# Patient Record
Sex: MALE | Race: WHITE | Employment: FULL TIME | URBAN - METROPOLITAN AREA
[De-identification: names, ages, dates, MRNs, and addresses within clinical notes are randomized per-mention and may not be internally consistent; named-entity substitution may affect disease eponyms.]

---
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
## 2018-12-04 ENCOUNTER — HOSPITAL ENCOUNTER (EMERGENCY)
Age: 18
Discharge: OTHER INSTITUTION | End: 2018-12-05
Attending: EMERGENCY MEDICINE
Payer: COMMERCIAL

## 2018-12-04 ENCOUNTER — APPOINTMENT (OUTPATIENT)
Dept: CT IMAGING | Age: 18
End: 2018-12-04
Payer: COMMERCIAL

## 2018-12-04 DIAGNOSIS — R41.82 ALTERED MENTAL STATUS, UNSPECIFIED ALTERED MENTAL STATUS TYPE: Primary | ICD-10-CM

## 2018-12-04 DIAGNOSIS — F32.89 OTHER DEPRESSION: ICD-10-CM

## 2018-12-04 LAB
ACETAMINOPHEN LEVEL: <15 UG/ML (ref 10–30)
ALBUMIN SERPL-MCNC: 5.7 G/DL (ref 3.9–4.9)
ALP BLD-CCNC: 45 U/L (ref 35–104)
ALT SERPL-CCNC: 14 U/L (ref 0–41)
ANION GAP SERPL CALCULATED.3IONS-SCNC: 12 MEQ/L (ref 7–13)
ANION GAP SERPL CALCULATED.3IONS-SCNC: 34 MEQ/L (ref 7–13)
AST SERPL-CCNC: 21 U/L (ref 0–40)
BASOPHILS ABSOLUTE: 0.1 K/UL (ref 0–0.2)
BASOPHILS RELATIVE PERCENT: 0.3 %
BILIRUB SERPL-MCNC: 1.1 MG/DL (ref 0–1.2)
BUN BLDV-MCNC: 6 MG/DL (ref 6–20)
BUN BLDV-MCNC: 7 MG/DL (ref 6–20)
CALCIUM SERPL-MCNC: 10.7 MG/DL (ref 8.6–10.2)
CALCIUM SERPL-MCNC: 9.3 MG/DL (ref 8.6–10.2)
CHLORIDE BLD-SCNC: 105 MEQ/L (ref 98–107)
CHLORIDE BLD-SCNC: 108 MEQ/L (ref 98–107)
CO2: 10 MEQ/L (ref 22–29)
CO2: 22 MEQ/L (ref 22–29)
CREAT SERPL-MCNC: 0.92 MG/DL (ref 0.7–1.2)
CREAT SERPL-MCNC: 1.02 MG/DL (ref 0.7–1.2)
EKG ATRIAL RATE: 97 BPM
EKG P AXIS: 73 DEGREES
EKG P-R INTERVAL: 160 MS
EKG Q-T INTERVAL: 386 MS
EKG QRS DURATION: 104 MS
EKG QTC CALCULATION (BAZETT): 490 MS
EKG R AXIS: 104 DEGREES
EKG T AXIS: 66 DEGREES
EKG VENTRICULAR RATE: 97 BPM
EOSINOPHILS ABSOLUTE: 0.1 K/UL (ref 0–0.7)
EOSINOPHILS RELATIVE PERCENT: 0.3 %
ETHANOL PERCENT: NORMAL G/DL
ETHANOL: <10 MG/DL (ref 0–0.08)
GFR AFRICAN AMERICAN: >60
GFR AFRICAN AMERICAN: >60
GFR NON-AFRICAN AMERICAN: >60
GFR NON-AFRICAN AMERICAN: >60
GLOBULIN: 3.3 G/DL (ref 2.3–3.5)
GLUCOSE BLD-MCNC: 140 MG/DL (ref 74–109)
GLUCOSE BLD-MCNC: 179 MG/DL (ref 74–109)
HCT VFR BLD CALC: 49.3 % (ref 42–52)
HEMOGLOBIN: 16.3 G/DL (ref 14–18)
LYMPHOCYTES ABSOLUTE: 3.6 K/UL (ref 1–4.8)
LYMPHOCYTES RELATIVE PERCENT: 16.8 %
MCH RBC QN AUTO: 29.3 PG (ref 27–31.3)
MCHC RBC AUTO-ENTMCNC: 33 % (ref 33–37)
MCV RBC AUTO: 88.8 FL (ref 80–100)
MONOCYTES ABSOLUTE: 1.1 K/UL (ref 0.2–0.8)
MONOCYTES RELATIVE PERCENT: 5.2 %
NEUTROPHILS ABSOLUTE: 16.5 K/UL (ref 1.4–6.5)
NEUTROPHILS RELATIVE PERCENT: 77.4 %
PDW BLD-RTO: 13.2 % (ref 11.5–14.5)
PLATELET # BLD: 269 K/UL (ref 130–400)
POTASSIUM SERPL-SCNC: 3.6 MEQ/L (ref 3.5–5.1)
POTASSIUM SERPL-SCNC: 4 MEQ/L (ref 3.5–5.1)
RBC # BLD: 5.56 M/UL (ref 4.7–6.1)
SALICYLATE, SERUM: <0.3 MG/DL (ref 15–30)
SODIUM BLD-SCNC: 142 MEQ/L (ref 132–144)
SODIUM BLD-SCNC: 149 MEQ/L (ref 132–144)
TOTAL CK: 139 U/L (ref 0–190)
TOTAL PROTEIN: 9 G/DL (ref 6.4–8.1)
WBC # BLD: 21.4 K/UL (ref 4.5–11)

## 2018-12-04 PROCEDURE — G0480 DRUG TEST DEF 1-7 CLASSES: HCPCS

## 2018-12-04 PROCEDURE — 2580000003 HC RX 258: Performed by: EMERGENCY MEDICINE

## 2018-12-04 PROCEDURE — 84443 ASSAY THYROID STIM HORMONE: CPT

## 2018-12-04 PROCEDURE — 70450 CT HEAD/BRAIN W/O DYE: CPT

## 2018-12-04 PROCEDURE — 93005 ELECTROCARDIOGRAM TRACING: CPT

## 2018-12-04 PROCEDURE — 99285 EMERGENCY DEPT VISIT HI MDM: CPT

## 2018-12-04 PROCEDURE — 80053 COMPREHEN METABOLIC PANEL: CPT

## 2018-12-04 PROCEDURE — 96374 THER/PROPH/DIAG INJ IV PUSH: CPT

## 2018-12-04 PROCEDURE — 6360000002 HC RX W HCPCS: Performed by: EMERGENCY MEDICINE

## 2018-12-04 PROCEDURE — 82550 ASSAY OF CK (CPK): CPT

## 2018-12-04 PROCEDURE — 36415 COLL VENOUS BLD VENIPUNCTURE: CPT

## 2018-12-04 PROCEDURE — 85025 COMPLETE CBC W/AUTO DIFF WBC: CPT

## 2018-12-04 RX ORDER — 0.9 % SODIUM CHLORIDE 0.9 %
1000 INTRAVENOUS SOLUTION INTRAVENOUS ONCE
Status: COMPLETED | OUTPATIENT
Start: 2018-12-04 | End: 2018-12-04

## 2018-12-04 RX ORDER — PAROXETINE HYDROCHLORIDE 20 MG/1
20 TABLET, FILM COATED ORAL
Status: ON HOLD | COMMUNITY
Start: 2018-12-05 | End: 2018-12-07 | Stop reason: HOSPADM

## 2018-12-04 RX ORDER — NALOXONE HYDROCHLORIDE 1 MG/ML
1 INJECTION INTRAMUSCULAR; INTRAVENOUS; SUBCUTANEOUS ONCE
Status: COMPLETED | OUTPATIENT
Start: 2018-12-04 | End: 2018-12-04

## 2018-12-04 RX ORDER — HYDROXYZINE HYDROCHLORIDE 25 MG/1
25 TABLET, FILM COATED ORAL EVERY 4 HOURS PRN
Status: ON HOLD | COMMUNITY
End: 2018-12-07 | Stop reason: HOSPADM

## 2018-12-04 RX ADMIN — SODIUM CHLORIDE 1000 ML: 9 INJECTION, SOLUTION INTRAVENOUS at 20:38

## 2018-12-04 RX ADMIN — NALOXONE HYDROCHLORIDE 1 MG: 1 INJECTION PARENTERAL at 20:36

## 2018-12-05 ENCOUNTER — APPOINTMENT (OUTPATIENT)
Dept: MRI IMAGING | Age: 18
DRG: 065 | End: 2018-12-05
Attending: INTERNAL MEDICINE
Payer: COMMERCIAL

## 2018-12-05 ENCOUNTER — HOSPITAL ENCOUNTER (INPATIENT)
Age: 18
LOS: 2 days | Discharge: HOME | DRG: 065 | End: 2018-12-07
Attending: INTERNAL MEDICINE | Admitting: INTERNAL MEDICINE
Payer: COMMERCIAL

## 2018-12-05 ENCOUNTER — APPOINTMENT (OUTPATIENT)
Dept: CT IMAGING | Age: 18
DRG: 065 | End: 2018-12-05
Attending: INTERNAL MEDICINE
Payer: COMMERCIAL

## 2018-12-05 VITALS
SYSTOLIC BLOOD PRESSURE: 115 MMHG | DIASTOLIC BLOOD PRESSURE: 66 MMHG | TEMPERATURE: 98.9 F | OXYGEN SATURATION: 97 % | RESPIRATION RATE: 16 BRPM | WEIGHT: 165 LBS | BODY MASS INDEX: 22.35 KG/M2 | HEART RATE: 91 BPM | HEIGHT: 72 IN

## 2018-12-05 PROBLEM — D72.829 LEUKOCYTOSIS: Status: ACTIVE | Noted: 2018-12-05

## 2018-12-05 LAB
AMPHETAMINE SCREEN, URINE: ABNORMAL
ANION GAP SERPL CALCULATED.3IONS-SCNC: 14 MEQ/L (ref 7–13)
BARBITURATE SCREEN URINE: ABNORMAL
BASOPHILS ABSOLUTE: 0 K/UL (ref 0–0.2)
BASOPHILS RELATIVE PERCENT: 0.3 %
BENZODIAZEPINE SCREEN, URINE: ABNORMAL
BILIRUBIN URINE: NEGATIVE
BLOOD, URINE: NEGATIVE
BUN BLDV-MCNC: 7 MG/DL (ref 6–20)
C-REACTIVE PROTEIN, HIGH SENSITIVITY: 2.2 MG/L (ref 0–5)
CALCIUM SERPL-MCNC: 8.8 MG/DL (ref 8.6–10.2)
CANNABINOID SCREEN URINE: POSITIVE
CHLORIDE BLD-SCNC: 106 MEQ/L (ref 98–107)
CLARITY: ABNORMAL
CO2: 22 MEQ/L (ref 22–29)
COCAINE METABOLITE SCREEN URINE: ABNORMAL
COLOR: YELLOW
CREAT SERPL-MCNC: 0.78 MG/DL (ref 0.7–1.2)
EOSINOPHILS ABSOLUTE: 0 K/UL (ref 0–0.7)
EOSINOPHILS RELATIVE PERCENT: 0.1 %
GFR AFRICAN AMERICAN: >60
GFR NON-AFRICAN AMERICAN: >60
GLUCOSE BLD-MCNC: 104 MG/DL (ref 74–109)
GLUCOSE URINE: NEGATIVE MG/DL
HCT VFR BLD CALC: 39.7 % (ref 42–52)
HEMOGLOBIN: 13.5 G/DL (ref 14–18)
INR BLD: 1.2
KETONES, URINE: ABNORMAL MG/DL
LACTIC ACID: 0.8 MMOL/L (ref 0.5–2.2)
LEUKOCYTE ESTERASE, URINE: NEGATIVE
LYMPHOCYTES ABSOLUTE: 1.4 K/UL (ref 1–4.8)
LYMPHOCYTES RELATIVE PERCENT: 13.5 %
Lab: ABNORMAL
MAGNESIUM: 2.3 MG/DL (ref 1.7–2.3)
MCH RBC QN AUTO: 30 PG (ref 27–31.3)
MCHC RBC AUTO-ENTMCNC: 34 % (ref 33–37)
MCV RBC AUTO: 88.4 FL (ref 80–100)
MONOCYTES ABSOLUTE: 0.6 K/UL (ref 0.2–0.8)
MONOCYTES RELATIVE PERCENT: 5.4 %
NEUTROPHILS ABSOLUTE: 8.5 K/UL (ref 1.4–6.5)
NEUTROPHILS RELATIVE PERCENT: 80.7 %
NITRITE, URINE: NEGATIVE
OPIATE SCREEN URINE: ABNORMAL
PDW BLD-RTO: 13.1 % (ref 11.5–14.5)
PH UA: 5 (ref 5–9)
PHENCYCLIDINE SCREEN URINE: ABNORMAL
PLATELET # BLD: 162 K/UL (ref 130–400)
POTASSIUM REFLEX MAGNESIUM: 3.3 MEQ/L (ref 3.5–5.1)
PROTEIN UA: NEGATIVE MG/DL
PROTHROMBIN TIME: 12 SEC (ref 9–11.5)
RBC # BLD: 4.49 M/UL (ref 4.7–6.1)
SEDIMENTATION RATE, ERYTHROCYTE: 2 MM (ref 0–10)
SODIUM BLD-SCNC: 142 MEQ/L (ref 132–144)
SPECIFIC GRAVITY UA: 1.02 (ref 1–1.03)
TSH SERPL DL<=0.05 MIU/L-ACNC: 1.94 UIU/ML (ref 0.27–4.2)
URINE REFLEX TO CULTURE: ABNORMAL
UROBILINOGEN, URINE: 0.2 E.U./DL
WBC # BLD: 10.6 K/UL (ref 4.5–11)

## 2018-12-05 PROCEDURE — 74160 CT ABDOMEN W/CONTRAST: CPT

## 2018-12-05 PROCEDURE — 2580000003 HC RX 258: Performed by: HOSPITALIST

## 2018-12-05 PROCEDURE — 80307 DRUG TEST PRSMV CHEM ANLYZR: CPT

## 2018-12-05 PROCEDURE — 70551 MRI BRAIN STEM W/O DYE: CPT

## 2018-12-05 PROCEDURE — 83605 ASSAY OF LACTIC ACID: CPT

## 2018-12-05 PROCEDURE — 6360000004 HC RX CONTRAST MEDICATION: Performed by: PSYCHIATRY & NEUROLOGY

## 2018-12-05 PROCEDURE — 86255 FLUORESCENT ANTIBODY SCREEN: CPT

## 2018-12-05 PROCEDURE — 86141 C-REACTIVE PROTEIN HS: CPT

## 2018-12-05 PROCEDURE — 6370000000 HC RX 637 (ALT 250 FOR IP): Performed by: PSYCHIATRY & NEUROLOGY

## 2018-12-05 PROCEDURE — 81003 URINALYSIS AUTO W/O SCOPE: CPT

## 2018-12-05 PROCEDURE — 83735 ASSAY OF MAGNESIUM: CPT

## 2018-12-05 PROCEDURE — 95816 EEG AWAKE AND DROWSY: CPT

## 2018-12-05 PROCEDURE — 70549 MR ANGIOGRAPH NECK W/O&W/DYE: CPT

## 2018-12-05 PROCEDURE — 85613 RUSSELL VIPER VENOM DILUTED: CPT

## 2018-12-05 PROCEDURE — 71270 CT THORAX DX C-/C+: CPT

## 2018-12-05 PROCEDURE — 85302 CLOT INHIBIT PROT C ANTIGEN: CPT

## 2018-12-05 PROCEDURE — 36415 COLL VENOUS BLD VENIPUNCTURE: CPT

## 2018-12-05 PROCEDURE — 85730 THROMBOPLASTIN TIME PARTIAL: CPT

## 2018-12-05 PROCEDURE — 85610 PROTHROMBIN TIME: CPT

## 2018-12-05 PROCEDURE — 80048 BASIC METABOLIC PNL TOTAL CA: CPT

## 2018-12-05 PROCEDURE — 99253 IP/OBS CNSLTJ NEW/EST LOW 45: CPT | Performed by: INTERNAL MEDICINE

## 2018-12-05 PROCEDURE — 2580000003 HC RX 258: Performed by: PSYCHIATRY & NEUROLOGY

## 2018-12-05 PROCEDURE — 85300 ANTITHROMBIN III ACTIVITY: CPT

## 2018-12-05 PROCEDURE — A9577 INJ MULTIHANCE: HCPCS | Performed by: PSYCHIATRY & NEUROLOGY

## 2018-12-05 PROCEDURE — 83516 IMMUNOASSAY NONANTIBODY: CPT

## 2018-12-05 PROCEDURE — 6360000002 HC RX W HCPCS: Performed by: HOSPITALIST

## 2018-12-05 PROCEDURE — 85652 RBC SED RATE AUTOMATED: CPT

## 2018-12-05 PROCEDURE — 1210000000 HC MED SURG R&B

## 2018-12-05 PROCEDURE — 85025 COMPLETE CBC W/AUTO DIFF WBC: CPT

## 2018-12-05 PROCEDURE — 85305 CLOT INHIBIT PROT S TOTAL: CPT

## 2018-12-05 PROCEDURE — 70546 MR ANGIOGRAPH HEAD W/O&W/DYE: CPT

## 2018-12-05 RX ORDER — ONDANSETRON 2 MG/ML
4 INJECTION INTRAMUSCULAR; INTRAVENOUS EVERY 6 HOURS PRN
Status: DISCONTINUED | OUTPATIENT
Start: 2018-12-05 | End: 2018-12-07 | Stop reason: HOSPADM

## 2018-12-05 RX ORDER — LORAZEPAM 2 MG/ML
1 INJECTION INTRAMUSCULAR
Status: ACTIVE | OUTPATIENT
Start: 2018-12-05 | End: 2018-12-05

## 2018-12-05 RX ORDER — SODIUM CHLORIDE 0.9 % (FLUSH) 0.9 %
10 SYRINGE (ML) INJECTION EVERY 12 HOURS SCHEDULED
Status: CANCELLED | OUTPATIENT
Start: 2018-12-05

## 2018-12-05 RX ORDER — SODIUM CHLORIDE 0.9 % (FLUSH) 0.9 %
10 SYRINGE (ML) INJECTION EVERY 12 HOURS SCHEDULED
Status: DISCONTINUED | OUTPATIENT
Start: 2018-12-05 | End: 2018-12-06

## 2018-12-05 RX ORDER — 0.9 % SODIUM CHLORIDE 0.9 %
10 VIAL (ML) INJECTION ONCE
Status: COMPLETED | OUTPATIENT
Start: 2018-12-05 | End: 2018-12-05

## 2018-12-05 RX ORDER — 0.9 % SODIUM CHLORIDE 0.9 %
1000 INTRAVENOUS SOLUTION INTRAVENOUS ONCE
Status: COMPLETED | OUTPATIENT
Start: 2018-12-05 | End: 2018-12-05

## 2018-12-05 RX ORDER — SODIUM CHLORIDE 9 MG/ML
INJECTION, SOLUTION INTRAVENOUS CONTINUOUS
Status: CANCELLED | OUTPATIENT
Start: 2018-12-06

## 2018-12-05 RX ORDER — ACETAMINOPHEN 325 MG/1
650 TABLET ORAL EVERY 4 HOURS PRN
Status: DISCONTINUED | OUTPATIENT
Start: 2018-12-05 | End: 2018-12-07 | Stop reason: HOSPADM

## 2018-12-05 RX ORDER — SODIUM CHLORIDE 0.9 % (FLUSH) 0.9 %
10 SYRINGE (ML) INJECTION PRN
Status: CANCELLED | OUTPATIENT
Start: 2018-12-05

## 2018-12-05 RX ORDER — SODIUM CHLORIDE 0.9 % (FLUSH) 0.9 %
10 SYRINGE (ML) INJECTION PRN
Status: DISCONTINUED | OUTPATIENT
Start: 2018-12-05 | End: 2018-12-06

## 2018-12-05 RX ORDER — LEVETIRACETAM 500 MG/1
500 TABLET ORAL 2 TIMES DAILY
Status: DISCONTINUED | OUTPATIENT
Start: 2018-12-05 | End: 2018-12-07 | Stop reason: HOSPADM

## 2018-12-05 RX ADMIN — ENOXAPARIN SODIUM 40 MG: 40 INJECTION SUBCUTANEOUS at 10:04

## 2018-12-05 RX ADMIN — Medication 10 ML: at 10:04

## 2018-12-05 RX ADMIN — LEVETIRACETAM 500 MG: 500 TABLET ORAL at 11:09

## 2018-12-05 RX ADMIN — IOPAMIDOL 100 ML: 612 INJECTION, SOLUTION INTRAVENOUS at 14:16

## 2018-12-05 RX ADMIN — SODIUM CHLORIDE 10 ML: 9 INJECTION INTRAMUSCULAR; INTRAVENOUS; SUBCUTANEOUS at 20:40

## 2018-12-05 RX ADMIN — GADOBENATE DIMEGLUMINE 20 ML: 529 INJECTION, SOLUTION INTRAVENOUS at 13:47

## 2018-12-05 RX ADMIN — ASPIRIN 325 MG: 325 TABLET, DELAYED RELEASE ORAL at 11:09

## 2018-12-05 RX ADMIN — LEVETIRACETAM 500 MG: 500 TABLET ORAL at 20:33

## 2018-12-05 RX ADMIN — SODIUM CHLORIDE 1000 ML: 9 INJECTION, SOLUTION INTRAVENOUS at 03:59

## 2018-12-05 ASSESSMENT — PAIN SCALES - GENERAL
PAINLEVEL_OUTOF10: 0
PAINLEVEL_OUTOF10: 0

## 2018-12-05 ASSESSMENT — ENCOUNTER SYMPTOMS
EYES NEGATIVE: 1
VOMITING: 0
NAUSEA: 0
ALLERGIC/IMMUNOLOGIC NEGATIVE: 1
SHORTNESS OF BREATH: 0
GASTROINTESTINAL NEGATIVE: 1
ABDOMINAL PAIN: 0
WHEEZING: 0

## 2018-12-05 NOTE — ED TRIAGE NOTES
Pt comes to the ED with mother. He was unresponsive upon arrival, was breathing and pulse  Ox at 94% on RA.  Pt was given narcan 1mg IV

## 2018-12-05 NOTE — CONSULTS
Fito Eid, APRN - CNP        LORazepam (ATIVAN) injection 1 mg  1 mg Intravenous Once PRN Nikki Phillips MD        aspirin EC tablet 325 mg  325 mg Oral Daily Nikki Phillips MD   325 mg at 12/05/18 1109    levETIRAcetam (KEPPRA) tablet 500 mg  500 mg Oral BID Nikki Phillips MD   500 mg at 12/05/18 1109    sodium chloride (PF) 0.9 % injection 10 mL  10 mL Intravenous Once Nikki Phillips MD           ALLERGIES: Patient has no known allergies. Review of Systems   Constitutional: Negative. Negative for chills and fever. HENT: Negative. Eyes: Negative. Respiratory: Negative for shortness of breath and wheezing. Cardiovascular: Negative for chest pain, palpitations and leg swelling. Gastrointestinal: Negative. Negative for abdominal pain, nausea and vomiting. Endocrine: Negative. Genitourinary: Negative. Musculoskeletal: Negative. Skin: Negative. Negative for rash. Allergic/Immunologic: Negative. Neurological: Negative for dizziness, weakness and headaches. Psychiatric/Behavioral: Negative. VITALS:  Blood pressure 124/81, pulse 55, temperature 97.2 °F (36.2 °C), temperature source Oral, resp. rate 16, height 6' 1\" (1.854 m), weight 141 lb 12.1 oz (64.3 kg), SpO2 100 %. Body mass index is 18.7 kg/m². Physical Exam   Constitutional: He is oriented to person, place, and time. He appears well-developed and well-nourished. HENT:   Head: Normocephalic and atraumatic. Eyes: Pupils are equal, round, and reactive to light. Neck: Normal range of motion. Neck supple. No JVD present. No tracheal deviation present. No thyromegaly present. Cardiovascular: Normal rate, regular rhythm, normal heart sounds and intact distal pulses. PMI is not displaced. Exam reveals no gallop, no S3, no distant heart sounds and no friction rub. No murmur heard. Pulmonary/Chest: No respiratory distress. He has no wheezes. He has no rales. He exhibits no tenderness. Abdominal: Soft.

## 2018-12-05 NOTE — PLAN OF CARE
Problem: Falls - Risk of:  Goal: Will remain free from falls  Will remain free from falls   Outcome: Ongoing    Goal: Absence of physical injury  Absence of physical injury   Outcome: Ongoing      Problem: Coping:  Goal: Ability to cope will improve  Ability to cope will improve  Outcome: Ongoing    Goal: Ability to identify appropriate support needs will improve  Ability to identify appropriate support needs will improve  Outcome: Ongoing      Problem: Health Behavior:  Goal: Ability to manage health-related needs will improve  Ability to manage health-related needs will improve  Outcome: Ongoing      Problem: Physical Regulation:  Goal: Signs of adequate cerebral perfusion will increase  Signs of adequate cerebral perfusion will increase  Outcome: Ongoing    Goal: Ability to maintain a stable neurologic state will improve  Ability to maintain a stable neurologic state will improve  Outcome: Ongoing      Problem: Safety:  Goal: Ability to remain free from injury will improve  Ability to remain free from injury will improve  Outcome: Ongoing      Problem: Self-Concept:  Goal: Level of anxiety will decrease  Level of anxiety will decrease  Outcome: Ongoing    Goal: Ability to verbalize feelings about condition will improve  Ability to verbalize feelings about condition will improve  Outcome: Ongoing

## 2018-12-05 NOTE — ED NOTES
23:07 Dr. Sukhi Lofton returned call to Dr. Sophia Martinez and accepted patient     Abby Chen  12/04/18 [place]  12/04/18 [id number]

## 2018-12-05 NOTE — ED PROVIDER NOTES
98.9 °F (37.2 °C)   TempSrc:  Oral   SpO2: 94% 97%   Weight: 165 lb (74.8 kg)    Height: 6' (1.829 m)        Patient presents to the emergency department with the complaint described above. Vital signs show tachycardia and tachypnea, he is a little hypertensive. Patient is awake but he is not cooperating with the exam.  At this time, seems like this could be some sort of sympathomimetic or even antihistamine presentation. Certainly it's possible that this is an acute psychotic breakdown as well. Possible severe depression. At this time, we did order 12-lead EKG, full metabolic workup that includes aspirin, salicylate and ethanol levels, TSH, CK, urinalysis and drug screen. IV fluids will be initiated and we will closely monitor the patient on monitoring. DIAGNOSTIC RESULTS     Twelve lead EKG interpreted by emergency department physician who either signs or cosigns this chart in the absence of a cardiologist:  A 12 lead EKG done at 2034, interpreted by myself, showed a regular rhythm at a rate of 97bpm.  The MD interval was normal.  The QRS complex was abnormal consistent with a right bundle branch block. There was no ST segment elevation or depression, T wave inversion present in right bundle-branch block pattern. QRS progression through precordial leads was abnormal.  Interpretation: In this rhythm, no ST segment elevation or depression and no pattern consistent with acute ischemia or infarct.  RBBB noted    LABS:  Labs Reviewed   CBC WITH AUTO DIFFERENTIAL - Abnormal; Notable for the following:        Result Value    WBC 21.4 (*)     Neutrophils # 16.5 (*)     Monocytes # 1.1 (*)     All other components within normal limits   COMPREHENSIVE METABOLIC PANEL - Abnormal; Notable for the following:     Sodium 149 (*)     CO2 10 (*)     Anion Gap 34 (*)     Glucose 179 (*)     Calcium 10.7 (*)     Total Protein 9.0 (*)     Alb 5.7 (*)     All other components within normal limits    Narrative:     CALL words are mis-transcribed.)    Nigel Kerr DO (electronically signed)  Attending Emergency Physician          Nigel Kerr DO  12/04/18 [id number]

## 2018-12-06 ENCOUNTER — APPOINTMENT (OUTPATIENT)
Dept: CARDIAC CATH/INVASIVE PROCEDURES | Age: 18
DRG: 065 | End: 2018-12-06
Attending: INTERNAL MEDICINE
Payer: COMMERCIAL

## 2018-12-06 LAB
ANION GAP SERPL CALCULATED.3IONS-SCNC: 13 MEQ/L (ref 7–13)
BASOPHILS ABSOLUTE: 0 K/UL (ref 0–0.2)
BASOPHILS RELATIVE PERCENT: 0.7 %
BUN BLDV-MCNC: 7 MG/DL (ref 6–20)
CALCIUM SERPL-MCNC: 9.2 MG/DL (ref 8.6–10.2)
CHLORIDE BLD-SCNC: 105 MEQ/L (ref 98–107)
CO2: 25 MEQ/L (ref 22–29)
CREAT SERPL-MCNC: 0.88 MG/DL (ref 0.7–1.2)
EOSINOPHILS ABSOLUTE: 0 K/UL (ref 0–0.7)
EOSINOPHILS RELATIVE PERCENT: 0.7 %
GFR AFRICAN AMERICAN: >60
GFR NON-AFRICAN AMERICAN: >60
GLUCOSE BLD-MCNC: 82 MG/DL (ref 74–109)
HCT VFR BLD CALC: 39.4 % (ref 42–52)
HEMOGLOBIN: 13.6 G/DL (ref 14–18)
LV EF: 60 %
LVEF MODALITY: NORMAL
LYMPHOCYTES ABSOLUTE: 2.4 K/UL (ref 1–4.8)
LYMPHOCYTES RELATIVE PERCENT: 34.4 %
MAGNESIUM: 2 MG/DL (ref 1.7–2.3)
MCH RBC QN AUTO: 30.5 PG (ref 27–31.3)
MCHC RBC AUTO-ENTMCNC: 34.4 % (ref 33–37)
MCV RBC AUTO: 88.6 FL (ref 80–100)
MONOCYTES ABSOLUTE: 0.5 K/UL (ref 0.2–0.8)
MONOCYTES RELATIVE PERCENT: 6.6 %
NEUTROPHILS ABSOLUTE: 4.1 K/UL (ref 1.4–6.5)
NEUTROPHILS RELATIVE PERCENT: 57.6 %
PDW BLD-RTO: 13.5 % (ref 11.5–14.5)
PLATELET # BLD: 164 K/UL (ref 130–400)
POTASSIUM REFLEX MAGNESIUM: 3.2 MEQ/L (ref 3.5–5.1)
RBC # BLD: 4.45 M/UL (ref 4.7–6.1)
SODIUM BLD-SCNC: 143 MEQ/L (ref 132–144)
WBC # BLD: 7 K/UL (ref 4.5–11)

## 2018-12-06 PROCEDURE — 80048 BASIC METABOLIC PNL TOTAL CA: CPT

## 2018-12-06 PROCEDURE — 6370000000 HC RX 637 (ALT 250 FOR IP): Performed by: PSYCHIATRY & NEUROLOGY

## 2018-12-06 PROCEDURE — 36415 COLL VENOUS BLD VENIPUNCTURE: CPT

## 2018-12-06 PROCEDURE — 2580000003 HC RX 258: Performed by: INTERNAL MEDICINE

## 2018-12-06 PROCEDURE — 93312 ECHO TRANSESOPHAGEAL: CPT | Performed by: INTERNAL MEDICINE

## 2018-12-06 PROCEDURE — 1210000000 HC MED SURG R&B

## 2018-12-06 PROCEDURE — 6370000000 HC RX 637 (ALT 250 FOR IP): Performed by: CLINICAL NURSE SPECIALIST

## 2018-12-06 PROCEDURE — 99253 IP/OBS CNSLTJ NEW/EST LOW 45: CPT | Performed by: CLINICAL NURSE SPECIALIST

## 2018-12-06 PROCEDURE — 6360000002 HC RX W HCPCS

## 2018-12-06 PROCEDURE — 93321 DOPPLER ECHO F-UP/LMTD STD: CPT

## 2018-12-06 PROCEDURE — 85025 COMPLETE CBC W/AUTO DIFF WBC: CPT

## 2018-12-06 PROCEDURE — 83735 ASSAY OF MAGNESIUM: CPT

## 2018-12-06 PROCEDURE — 93325 DOPPLER ECHO COLOR FLOW MAPG: CPT

## 2018-12-06 PROCEDURE — 93312 ECHO TRANSESOPHAGEAL: CPT

## 2018-12-06 PROCEDURE — 6360000002 HC RX W HCPCS: Performed by: HOSPITALIST

## 2018-12-06 PROCEDURE — 6370000000 HC RX 637 (ALT 250 FOR IP): Performed by: INTERNAL MEDICINE

## 2018-12-06 RX ORDER — SODIUM CHLORIDE 9 MG/ML
INJECTION, SOLUTION INTRAVENOUS CONTINUOUS
Status: DISCONTINUED | OUTPATIENT
Start: 2018-12-06 | End: 2018-12-07 | Stop reason: HOSPADM

## 2018-12-06 RX ORDER — FAMOTIDINE 20 MG/1
20 TABLET, FILM COATED ORAL 2 TIMES DAILY
Status: DISCONTINUED | OUTPATIENT
Start: 2018-12-06 | End: 2018-12-07 | Stop reason: HOSPADM

## 2018-12-06 RX ORDER — VENLAFAXINE 37.5 MG/1
37.5 TABLET ORAL
Status: DISCONTINUED | OUTPATIENT
Start: 2018-12-06 | End: 2018-12-07

## 2018-12-06 RX ORDER — FENTANYL CITRATE 50 UG/ML
100 INJECTION, SOLUTION INTRAMUSCULAR; INTRAVENOUS ONCE
Status: DISCONTINUED | OUTPATIENT
Start: 2018-12-06 | End: 2018-12-07 | Stop reason: HOSPADM

## 2018-12-06 RX ORDER — SODIUM CHLORIDE 450 MG/100ML
INJECTION, SOLUTION INTRAVENOUS
Status: DISCONTINUED
Start: 2018-12-06 | End: 2018-12-06

## 2018-12-06 RX ORDER — MIDAZOLAM HYDROCHLORIDE 1 MG/ML
5 INJECTION INTRAMUSCULAR; INTRAVENOUS ONCE
Status: DISCONTINUED | OUTPATIENT
Start: 2018-12-06 | End: 2018-12-07 | Stop reason: HOSPADM

## 2018-12-06 RX ADMIN — LEVETIRACETAM 500 MG: 500 TABLET ORAL at 21:53

## 2018-12-06 RX ADMIN — SODIUM CHLORIDE: 9 INJECTION, SOLUTION INTRAVENOUS at 17:55

## 2018-12-06 RX ADMIN — ONDANSETRON 4 MG: 2 INJECTION INTRAMUSCULAR; INTRAVENOUS at 17:57

## 2018-12-06 RX ADMIN — FAMOTIDINE 20 MG: 20 TABLET ORAL at 21:53

## 2018-12-06 RX ADMIN — VENLAFAXINE 37.5 MG: 37.5 TABLET ORAL at 18:37

## 2018-12-06 RX ADMIN — LEVETIRACETAM 500 MG: 500 TABLET ORAL at 09:15

## 2018-12-06 ASSESSMENT — PAIN SCALES - GENERAL: PAINLEVEL_OUTOF10: 0

## 2018-12-07 VITALS
RESPIRATION RATE: 14 BRPM | BODY MASS INDEX: 18.79 KG/M2 | TEMPERATURE: 97.5 F | SYSTOLIC BLOOD PRESSURE: 102 MMHG | DIASTOLIC BLOOD PRESSURE: 53 MMHG | HEIGHT: 73 IN | WEIGHT: 141.76 LBS | HEART RATE: 47 BPM | OXYGEN SATURATION: 99 %

## 2018-12-07 LAB
ANION GAP SERPL CALCULATED.3IONS-SCNC: 13 MEQ/L (ref 7–13)
BASOPHILS ABSOLUTE: 0 K/UL (ref 0–0.2)
BASOPHILS RELATIVE PERCENT: 0.7 %
BUN BLDV-MCNC: 8 MG/DL (ref 6–20)
CALCIUM SERPL-MCNC: 8.3 MG/DL (ref 8.6–10.2)
CHLORIDE BLD-SCNC: 107 MEQ/L (ref 98–107)
CHOLESTEROL, TOTAL: 112 MG/DL (ref 0–199)
CO2: 23 MEQ/L (ref 22–29)
CREAT SERPL-MCNC: 0.78 MG/DL (ref 0.7–1.2)
EOSINOPHILS ABSOLUTE: 0.1 K/UL (ref 0–0.7)
EOSINOPHILS RELATIVE PERCENT: 1 %
GFR AFRICAN AMERICAN: >60
GFR NON-AFRICAN AMERICAN: >60
GLUCOSE BLD-MCNC: 86 MG/DL (ref 74–109)
HCT VFR BLD CALC: 37.5 % (ref 42–52)
HDLC SERPL-MCNC: 28 MG/DL (ref 40–59)
HEMOGLOBIN: 13 G/DL (ref 14–18)
LDL CHOLESTEROL CALCULATED: 66 MG/DL (ref 0–129)
LYMPHOCYTES ABSOLUTE: 2.3 K/UL (ref 1–4.8)
LYMPHOCYTES RELATIVE PERCENT: 34.7 %
MCH RBC QN AUTO: 30.4 PG (ref 27–31.3)
MCHC RBC AUTO-ENTMCNC: 34.8 % (ref 33–37)
MCV RBC AUTO: 87.6 FL (ref 80–100)
MONOCYTES ABSOLUTE: 0.5 K/UL (ref 0.2–0.8)
MONOCYTES RELATIVE PERCENT: 7.4 %
NEUTROPHILS ABSOLUTE: 3.7 K/UL (ref 1.4–6.5)
NEUTROPHILS RELATIVE PERCENT: 56.2 %
PDW BLD-RTO: 13.1 % (ref 11.5–14.5)
PLATELET # BLD: 142 K/UL (ref 130–400)
POTASSIUM REFLEX MAGNESIUM: 3.8 MEQ/L (ref 3.5–5.1)
RBC # BLD: 4.28 M/UL (ref 4.7–6.1)
SODIUM BLD-SCNC: 143 MEQ/L (ref 132–144)
TRIGL SERPL-MCNC: 89 MG/DL (ref 0–200)
WBC # BLD: 6.5 K/UL (ref 4.5–11)

## 2018-12-07 PROCEDURE — 85025 COMPLETE CBC W/AUTO DIFF WBC: CPT

## 2018-12-07 PROCEDURE — 80048 BASIC METABOLIC PNL TOTAL CA: CPT

## 2018-12-07 PROCEDURE — 6370000000 HC RX 637 (ALT 250 FOR IP): Performed by: CLINICAL NURSE SPECIALIST

## 2018-12-07 PROCEDURE — 6370000000 HC RX 637 (ALT 250 FOR IP): Performed by: INTERNAL MEDICINE

## 2018-12-07 PROCEDURE — 6370000000 HC RX 637 (ALT 250 FOR IP): Performed by: PSYCHIATRY & NEUROLOGY

## 2018-12-07 PROCEDURE — 36415 COLL VENOUS BLD VENIPUNCTURE: CPT

## 2018-12-07 PROCEDURE — 80061 LIPID PANEL: CPT

## 2018-12-07 RX ORDER — ATORVASTATIN CALCIUM 10 MG/1
10 TABLET, FILM COATED ORAL NIGHTLY
Status: DISCONTINUED | OUTPATIENT
Start: 2018-12-07 | End: 2018-12-07 | Stop reason: HOSPADM

## 2018-12-07 RX ORDER — VENLAFAXINE HYDROCHLORIDE 37.5 MG/1
37.5 CAPSULE, EXTENDED RELEASE ORAL
Qty: 30 CAPSULE | Refills: 1 | Status: SHIPPED | OUTPATIENT
Start: 2018-12-08 | End: 2020-04-02 | Stop reason: DRUGHIGH

## 2018-12-07 RX ORDER — VENLAFAXINE HYDROCHLORIDE 37.5 MG/1
37.5 CAPSULE, EXTENDED RELEASE ORAL
Status: DISCONTINUED | OUTPATIENT
Start: 2018-12-08 | End: 2018-12-07 | Stop reason: HOSPADM

## 2018-12-07 RX ORDER — LEVETIRACETAM 500 MG/1
500 TABLET ORAL 2 TIMES DAILY
Qty: 60 TABLET | Refills: 1 | Status: SHIPPED | OUTPATIENT
Start: 2018-12-07 | End: 2020-02-11

## 2018-12-07 RX ORDER — ATORVASTATIN CALCIUM 10 MG/1
10 TABLET, FILM COATED ORAL NIGHTLY
Qty: 30 TABLET | Refills: 1 | Status: SHIPPED | OUTPATIENT
Start: 2018-12-07 | End: 2020-02-11

## 2018-12-07 RX ADMIN — ASPIRIN 325 MG: 325 TABLET, DELAYED RELEASE ORAL at 08:27

## 2018-12-07 RX ADMIN — VENLAFAXINE 37.5 MG: 37.5 TABLET ORAL at 08:27

## 2018-12-07 RX ADMIN — LEVETIRACETAM 500 MG: 500 TABLET ORAL at 08:27

## 2018-12-07 RX ADMIN — FAMOTIDINE 20 MG: 20 TABLET ORAL at 08:27

## 2018-12-07 ASSESSMENT — PAIN SCALES - GENERAL: PAINLEVEL_OUTOF10: 0

## 2018-12-07 NOTE — DISCHARGE SUMMARY
Conjunctivae/corneas clear. Neck: Supple, with full range of motion. No jugular venous distention. Trachea midline. Respiratory:  Normal respiratory effort. Clear to auscultation, bilaterally without Rales/Wheezes/Rhonchi. Cardiovascular: Regular rate and rhythm with normal S1/S2 without murmurs, rubs or gallops. Abdomen: Soft, non-tender, non-distended with normal bowel sounds. Musculoskeletal: No clubbing, cyanosis or edema bilaterally. Full range of motion without deformity. Skin: Skin color, texture, turgor normal.  No rashes or lesions. Neuro: Non Focal. Symetrical motor and tone. Nl Comprehension, Alert,awake and oriented. NL CN. Symetrical tone and reflexes. Psychiatric: Alert and oriented, thought content appropriate, normal insight  Capillary Refill: Brisk,< 3 seconds   Peripheral Pulses: +2 palpable, equal bilaterally     Patient was seen by the following consultants while admitted to [facility]:   Consults:  Reynaldo Olivares   CONSULT TO CARDIOLOGY    Significant Diagnostic Studies:    Ct Chest W Wo Contrast    Result Date: 12/5/2018  Study: CT scan of the chest without with contrast Reason for exam: Embolic stroke. Abnormal physical exam findings suggestive of Marfan's syndrome. Findings: Unenhanced scans were obtained. Postcontrast images were included with the patient receiving 100 mL of Isovue-300. No significant dilatation of the thoracic aorta or pulmonary outflow track. The ascending thoracic aorta is 2.5 cm in diameter and the pulmonary outflow track is 2.8 cm in diameter. Following the injection of contrast there is no intraluminal abnormality  in the aorta to suggest dissection. Unenhanced study reveals no suspicious calcification of the heart valves. No significant cardiomegaly appreciated. No pericardial effusion. No suspicious mediastinal mass seen. Lung windows reveal no consolidating infiltrate or effusion.  The extrathoracic

## 2018-12-07 NOTE — PROGRESS NOTES
Neurology Follow up    SUBJECTIVE:  NO Headache, double vision,blurry vision,difficulty with speech,difficulty with swallowing,weakness,numbness,pain,nausea,vomitting,chocking,neck pain,dizziness,    PHYSICAL EXAM:    /70   Pulse 55   Temp 98.2 °F (36.8 °C) (Oral)   Resp 18   Ht 6' 1\" (1.854 m)   Wt 141 lb 12.1 oz (64.3 kg)   SpO2 100%   BMI 18.70 kg/m²    General Appearance:      Mental Status Exam:             Level of Alertness:   awake            Orientation:   person, place, time                    Attention/Concentration:  normal            Language:  normal      Funduscopic Exam:     Cranial Nerves        Cranial nerve II           Visual acuity:  normal           Visual fields:  normal      Cranial nerve III           Pupils:  equal, round, reactive to light      Cranial nerves III, IV, VI           Extraocular Movements: intact      Cranial nerve V           Facial sensation:  intact      Cranial nerve VII           Facial strength: intact      Cranial nerve VIII           Hearing:  intact      Cranial nerve IX           Palate:  intact      Cranial nerve XI         Shoulder shrug:  intact      Cranial nerve XII          Tongue movement:  normal    Motor:    Drift:  absent  Motor exam is symmetrical 5 out of 5 all extremities bilaterally  Tone:  normal  Abnormal Movements:  absent            Sensory:        Pinprick             Right Upper Extremity:  normal             Left Upper Extremity:  normal             Right Lower Extremity:  normal             Left Lower Extremity:  normal           Vibration                         Touch            Proprioception                 Coordination:           Finger/Nose   Right:  normal              Left:  normal          Heel-Knee-Shin                Right:  normal              Left:  normal          Rapid Alternating Movements              Right:  normal              Left:  normal          Gait:                       Casual:  normal
This RN talked with patient ALONE in room. Patient states he doesn't now nor ever had suicidal thoughts. Patient also denies any type of abuse.  1:1 has been removed
400 MG/5ML suspension 30 mL  30 mL Oral Daily PRN Rossana Rm, APRN - CNP        ondansetron (ZOFRAN) injection 4 mg  4 mg Intravenous Q6H PRN Rossana Rm, APRN - CNP   4 mg at 12/06/18 1757    acetaminophen (TYLENOL) tablet 650 mg  650 mg Oral Q4H PRN Rossana Rm, APRN - CNP        aspirin EC tablet 325 mg  325 mg Oral Daily Ladan Castro MD   325 mg at 12/07/18 0827    levETIRAcetam (KEPPRA) tablet 500 mg  500 mg Oral BID Ladan Castro MD   500 mg at 12/07/18 0827     Assessment and Plan:          Acute Hospital issues:    # acute encephalopathy due to Acute stroke and seizure- other encephalopathy  -much more awake. Appropriate   - EEG pending   - discussed with Dr Ayan Spaulding. Thinks pt has Marfan habitus and hypercoagulable state, aneurysms, and vasculitis must be evaluated   - imaging noted  - toxicology screen pos for only THC  - f/u CTA chest and MRA head a nd neck   - DEBORAH ok, has small PFO but flow is L to R    # bradycardia  - monitor on tele. Rate ok now. Cardiology is on. Not on any AVB agents. # mild splenomegaly  - f/u outpt.  No symptoms     # incidental findings on CTA chest   - SCOLIOSIS AND PECTUS EXCAVATUM DEFORMITY LIKELY    # depression   - mood is normal   - monitor     # marijuana  use   - needs counseling       DVT/PPx- ordered if appropriate        DISCHARGE PLANNING  Pending neurology        Electronically signed by Zee Alvarez DO on 12/7/2018 at 12:36 PM
LIKELY, CANNOT BE DISMISSED. FINDINGS DISCUSSED WITH DR. DARÍO WANG VIA TELEPHONE AT THE TIME OF THIS DICTATION. Mra Head W Wo Contarst    Result Date: 12/5/2018  EXAMINATION: MRA HEAD W WO CONTRAST, MRA NECK W WO CONTRAST, 12/5/2018 1:30 PM HISTORY:  78-year-old male, hemiplegia, suspect aneurysm COMPARISON:  MRI brain December 5, 2018 TECHNIQUE: Time-of-flight images through the [place] and neck were performed without contrast for MRA head and neck followed by with contrast imaging. FINDINGS: MRA BRAIN: Anterior cerebral arteries: No stenosis or malformation. Expected distal arborization pattern. There is an anterior communicating artery. Middle cerebral arteries: No stenosis, aneurysm or malformation. Expected distal arborization pattern. Vertebrobasilar circulation: No stenosis, aneurysm or malformation. Expected distal arborization pattern. MRA NECK: Right carotid artery: No stenosis, dissection or malformation. Right vertebral artery: No stenosis, dissection or malformation. Left carotid artery: No stenosis, dissection or malformation. Left vertebral artery: No stenosis, dissection or malformation. 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 The dural venous sinuses opacify normally on postcontrast images. Asymmetric left decreased signal intensity in the left insula corresponding to the area of restricted diffusion seen on comparison MRI. There is symmetric enhancement of blood vessels in the sylvian fissures. Normal appearance of the vessels in the neck and [place]. No stenosis, aneurysm, dissection or malformation.        Recent Labs      12/04/18   2032  12/05/18   0309  12/06/18   0536   WBC  21.4*  10.6  7.0   HGB  16.3  13.5*  13.6*   PLT  269  162  164     Recent Labs      12/04/18   2242  12/05/18   0309  12/06/18   0536   NA  142  142  143   K  4.0  3.3*  3.2*   CL  108*  106  105   CO2  22  22  25   BUN  6  7  7   CREATININE  0.92  0.78  0.88   GLUCOSE  140*  104

## 2018-12-08 LAB
ANTITHROMBIN ACTIVITY: 95 % (ref 76–128)
DOUBLE STRANDED DNA AB, IGG: NORMAL
DRVVT CONFIRMATION TEST: ABNORMAL RATIO
DRVVT SCREEN: 35 SEC (ref 33–44)
DRVVT,DIL: ABNORMAL SEC (ref 33–44)
HEXAGONAL PHOSPHOLIPID NEUTRALIZAT TEST: ABNORMAL
LUPUS ANTICOAG INTERP: ABNORMAL
PLT NEUTA: ABNORMAL
PROTEIN C ANTIGEN: 81 % (ref 63–153)
PT D: 16 SEC (ref 12–15.5)
PTT D: 37 SEC (ref 32–48)
PTT-D CORR REFLEX: ABNORMAL SEC (ref 32–48)
PTT-HEPARIN NEUTRALIZED: ABNORMAL SEC (ref 32–48)
REPTILASE TIME: ABNORMAL SEC
THROMBIN TIME: ABNORMAL SEC (ref 14.7–19.5)

## 2018-12-09 LAB — PROTEIN S ANTIGEN, TOTAL: 80 % (ref 84–134)

## 2018-12-14 LAB
ANTIPHOSPHOLIPID AB IGG: 3 GPL (ref 0–14)
ANTIPHOSPHOLIPID AB IGM: 6 MPL (ref 0–14)

## 2020-02-12 RX ORDER — LEVETIRACETAM 500 MG/1
TABLET ORAL
Qty: 180 TABLET | Refills: 1 | Status: SHIPPED | OUTPATIENT
Start: 2020-02-12 | End: 2020-04-02 | Stop reason: SDUPTHER

## 2020-02-12 RX ORDER — ATORVASTATIN CALCIUM 10 MG/1
TABLET, FILM COATED ORAL
Qty: 90 TABLET | Refills: 3 | Status: SHIPPED | OUTPATIENT
Start: 2020-02-12 | End: 2020-04-02 | Stop reason: SDUPTHER

## 2020-04-02 ENCOUNTER — VIRTUAL VISIT (OUTPATIENT)
Dept: NEUROLOGY | Age: 20
End: 2020-04-02
Payer: COMMERCIAL

## 2020-04-02 PROBLEM — Q87.40 MARFAN'S SYNDROME: Status: ACTIVE | Noted: 2020-04-02

## 2020-04-02 PROCEDURE — 99213 OFFICE O/P EST LOW 20 MIN: CPT | Performed by: PSYCHIATRY & NEUROLOGY

## 2020-04-02 RX ORDER — ATORVASTATIN CALCIUM 10 MG/1
TABLET, FILM COATED ORAL
Qty: 90 TABLET | Refills: 3 | Status: SHIPPED | OUTPATIENT
Start: 2020-04-02 | End: 2021-05-10 | Stop reason: SDUPTHER

## 2020-04-02 RX ORDER — VENLAFAXINE HYDROCHLORIDE 75 MG/1
CAPSULE, EXTENDED RELEASE ORAL
Qty: 90 CAPSULE | Refills: 3 | Status: SHIPPED | OUTPATIENT
Start: 2020-04-02 | End: 2021-09-20

## 2020-04-02 RX ORDER — VENLAFAXINE HYDROCHLORIDE 75 MG/1
CAPSULE, EXTENDED RELEASE ORAL
COMMUNITY
Start: 2020-03-12 | End: 2020-04-02 | Stop reason: SDUPTHER

## 2020-04-02 RX ORDER — VENLAFAXINE HYDROCHLORIDE 37.5 MG/1
37.5 CAPSULE, EXTENDED RELEASE ORAL DAILY
Qty: 90 CAPSULE | Refills: 3 | Status: SHIPPED | OUTPATIENT
Start: 2020-04-02 | End: 2021-09-20

## 2020-04-02 RX ORDER — LEVETIRACETAM 500 MG/1
TABLET ORAL
Qty: 180 TABLET | Refills: 3 | Status: SHIPPED | OUTPATIENT
Start: 2020-04-02 | End: 2021-05-10 | Stop reason: SDUPTHER

## 2020-04-02 ASSESSMENT — ENCOUNTER SYMPTOMS
CHOKING: 0
SHORTNESS OF BREATH: 0
VOMITING: 0
NAUSEA: 0
BACK PAIN: 0
PHOTOPHOBIA: 0
TROUBLE SWALLOWING: 0

## 2020-04-02 NOTE — PROGRESS NOTES
syndrome. Findings: Unenhanced scans were followed by an enhanced study. No additional IV contrast was given. Please see CT chest report. The abdominal aorta is normal in caliber. There is normal opacification of the abdominal aorta and its major branches in the abdomen. Arterial flow is widely patent to the level of the iliac vasculature, the lower limits of the study. The liver is normal in appearance. The spleen is moderately enlarged measuring 15 to 16 cm in length with the splenic index of 684. Pancreas is normal. No inflammation in the epigastric soft tissues. Stomach unremarkable. Gallbladder and biliary tree within normal limits. There is prompt uptake and excretion by the kidneys. No suspicious renal mass. No retroperitoneal mass or fluid collection. Large and small bowel unremarkable. No evidence of bowel obstruction. Bones grossly intact. Mild levoscoliosis in the lumbar spine noted. MILD SPLENOMEGALY. ABDOMINAL AORTA, ABDOMINAL SOLID VISCERA, AND BOWEL UNREMARKABLE. All CT scans at this facility use dose modulation, iterative reconstruction, and/or weight based dosing when appropriate to reduce radiation dose to as low as reasonably achievable. Mra Neck W Wo Contrast    Result Date: 12/5/2018  EXAMINATION: MRA HEAD W WO CONTRAST, MRA NECK W WO CONTRAST, 12/5/2018 1:30 PM HISTORY:  24-year-old male, hemiplegia, suspect aneurysm COMPARISON:  MRI brain December 5, 2018 TECHNIQUE: Time-of-flight images through the [place] and neck were performed without contrast for MRA head and neck followed by with contrast imaging. FINDINGS: MRA BRAIN: Anterior cerebral arteries: No stenosis or malformation. Expected distal arborization pattern. There is an anterior communicating artery. Middle cerebral arteries: No stenosis, aneurysm or malformation. Expected distal arborization pattern. Vertebrobasilar circulation: No stenosis, aneurysm or malformation. Expected distal arborization pattern.  MRA NECK: Right carotid artery: No stenosis, dissection or malformation. Right vertebral artery: No stenosis, dissection or malformation. Left carotid artery: No stenosis, dissection or malformation. Left vertebral artery: No stenosis, dissection or malformation. 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 The dural venous sinuses opacify normally on postcontrast images. Asymmetric left decreased signal intensity in the left insula corresponding to the area of restricted diffusion seen on comparison MRI. There is symmetric enhancement of blood vessels in the sylvian fissures. Normal appearance of the vessels in the neck and [place]. No stenosis, aneurysm, dissection or malformation. Mri Brain Wo Contrast    Result Date: 12/5/2018  EXAMINATION:MRI BRAIN WO CONTRAST CLINICAL HISTORY: EPILEPSY TECHNIQUE:  Unenhanced scans were obtained. T1 and T2 weighted sequences along with FLAIR and diffusion weighted imaging and gradient echo axial sequences were acquired. FINDINGS: Study reveals restricted diffusion with signal dropout on ADC sequence and associated susceptibility artifact in the left anterior insular cortex. There is a second area of edema in the left parietal lobe. There is relative sparing of the white matter. Differential diagnosis would be a infarct with petechial hemorrhage versus encephalitis with associated hemorrhage. Herpes infection is a consideration. Edema status post brain seizure is not commonly associated with hemorrhage. There is no considerable mass effect No evidence of significant demyelinating disease. No associated atrophy. There is normal signal void in the cerebral vasculature at the level of the [place]. Inflammatory changes in the left maxillary sinus noted where a 2.8 cm polyp or retention cyst is present. No air-fluid levels in the sinuses or mastoids. LEFT INSULAR INFARCT WITH PETECHIAL HEMORRHAGE SUSPECTED. EMBOLIC PHENOMENA A CONSIDERATION. release capsule     Sig: TAKE 1 CAPSULE BY MOUTH EVERY DAY     Dispense:  90 capsule     Refill:  3    levETIRAcetam (KEPPRA) 500 MG tablet     Sig: TAKE 1 TABLET BY MOUTH TWICE DAILY     Dispense:  180 tablet     Refill:  3    venlafaxine (EFFEXOR XR) 37.5 MG extended release capsule     Sig: Take 1 capsule by mouth daily With the 75 mgs er     Dispense:  90 capsule     Refill:  3       No follow-ups on file.       Anh Benitez MD

## 2020-07-21 NOTE — H&P
Neuroscience notified pt ready.    verbally, only opens eyes to command. Unable to do ROS at this time    PAST MEDICAL HISTORY:    Past Medical History:   Diagnosis Date    Appendicitis     Depression      PAST SURGICAL HISTORY:    Past Surgical History:   Procedure Laterality Date    APPENDECTOMY      HERNIA REPAIR       FAMILY HISTORY:    Family History   Problem Relation Age of Onset    Family history unknown: Yes     SOCIAL HISTORY:    Social History     Social History    Marital status: Single     Spouse name: N/A    Number of children: N/A    Years of education: N/A     Occupational History    Not on file. Social History Main Topics    Smoking status: Current Every Day Smoker     Types: E-Cigarettes    Smokeless tobacco: Current User    Alcohol use Yes    Drug use: Yes     Types: Marijuana    Sexual activity: Not on file     Other Topics Concern    Not on file     Social History Narrative    No narrative on file     MEDICATIONS:   Prior to Admission medications    Medication Sig Start Date End Date Taking? Authorizing Provider   PARoxetine (PAXIL) 20 MG tablet Take 20 mg by mouth daily (with breakfast)  12/5/18  Yes Historical Provider, MD   hydrOXYzine (ATARAX) 25 MG tablet Take 25 mg by mouth every 4 hours as needed for Anxiety    Yes Historical Provider, MD       ALLERGIES: Patient has no known allergies. REVIEW OF SYSTEM:   ROS as noted in HPI, 12 point ROS reviewed and otherwise negative.     OBJECTIVE  PHYSICAL EXAM: Ht 6' 1\" (1.854 m)   Wt 139 lb 15.9 oz (63.5 kg)   BMI 18.47 kg/m²     CONSTITUTIONAL:  lethargic, somnolent, uncooperative and flushed  EYES:  Lids and lashes normal, pupils equal, round and reactive to light, extra ocular muscles intact, sclera clear, conjunctiva normal  LUNGS:  No increased work of breathing, good air exchange, clear to auscultation bilaterally, no crackles or wheezing  CARDIOVASCULAR:  Normal apical impulse, regular rate and rhythm, normal S1 and S2, no S3 or S4, and no murmur ALT 14 0 - 41 U/L    AST 21 0 - 40 U/L    Globulin 3.3 2.3 - 3.5 g/dL   Ethanol    Collection Time: 12/04/18  8:32 PM   Result Value Ref Range    Ethanol Lvl <10 mg/dL    Ethanol percent Not indicated G/dL   CK    Collection Time: 12/04/18  8:32 PM   Result Value Ref Range    Total  0 - 390 U/L   Salicylate    Collection Time: 12/04/18  8:32 PM   Result Value Ref Range    Salicylate, Serum <0.1 (L) 15.0 - 30.0 mg/dL   Acetaminophen Level    Collection Time: 12/04/18  8:32 PM   Result Value Ref Range    Acetaminophen Level <15 10 - 30 ug/mL   EKG 12 Lead    Collection Time: 12/04/18  8:34 PM   Result Value Ref Range    Ventricular Rate 97 BPM    Atrial Rate 97 BPM    P-R Interval 160 ms    QRS Duration 104 ms    Q-T Interval 386 ms    QTc Calculation (Bazett) 490 ms    P Axis 73 degrees    R Axis 104 degrees    T Axis 66 degrees   Basic Metabolic Panel    Collection Time: 12/04/18 10:42 PM   Result Value Ref Range    Sodium 142 132 - 144 mEq/L    Potassium 4.0 3.5 - 5.1 mEq/L    Chloride 108 (H) 98 - 107 mEq/L    CO2 22 22 - 29 mEq/L    Anion Gap 12 7 - 13 mEq/L    Glucose 140 (H) 74 - 109 mg/dL    BUN 6 6 - 20 mg/dL    CREATININE 0.92 0.70 - 1.20 mg/dL    GFR Non-African American >60.0 >60    GFR  >60.0 >60    Calcium 9.3 8.6 - 10.2 mg/dL       IMAGING:  No results found. VTE Prophylaxis: low molecular weight heparin -  start    [address]  Patient Active Hospital Problem List:   Altered mental status (12/4/2018)    Assessment: Patient presents like a postictal state, and mother patient had increase in change in mental status from before after patient had sudden shaking in the vehicle and would not respond to her, marijuana user unsure if patient uses any other drugs, unresponsive to Narcan     Plan:   Admit as inpatient for further workup with telemetry  Consult neurology  EEG in a.m. MRI in a.m.   Check lactic acid  Urine drug screen  Fluid bolus   Seizure precautions  Neuro checks frequently      Leukocytosis (12/5/2018)    Assessment: WBC 21.4, no known fevers per mother     Plan:   Treat as reactive for now since no other signs of infection  Check UA  Daily CBCD     Depression  Assessment: Has been on/off anti-depressant  Plan:  Consult psychiatry          Plan of care discussed with: parents    SIGNATURE: KARLA Steward CNP  DATE: December 5, 2018  TIME: 3:10 AM     Electronically signed by KARLA Steward CNP on 12/5/2018 at 3:46 AM     Dr. Randa Lopez MD - supervising physician    Attending's Note:  Pt was seen and evaluated and discussed care with NP. I agree with the above assessment and plan. Patient was complaining of not feeling well and numbness in his extremities. He was altered and not making sense according to his mother. On the way to the hospital, he was shacking and having foam from his mouth. It seems like he was having seizure like activities. He was acidotic upon arrival and on repeat of labs, his acidosis resolved. He was not responsive in the ER at [facility] [facility]. He was given narcan which did not do anything. Mother denied use of any drugs and he does not drink much alcohol. Will consult neurology. Will do EEG and MRI. Will start anti-seizure meds if he has another witnessed episode or if neurology decides to.       Savi Meyer, [id number]

## 2020-09-03 LAB
BASOPHILS ABSOLUTE: 1 /ΜL
BASOPHILS RELATIVE PERCENT: 0 %
EOSINOPHILS ABSOLUTE: 0.1 /ΜL
EOSINOPHILS RELATIVE PERCENT: 0.1 %
HCT VFR BLD CALC: 43.8 % (ref 41–53)
HEMOGLOBIN: 14.1 G/DL (ref 13.5–17.5)
LYMPHOCYTES ABSOLUTE: 1.8 /ΜL
LYMPHOCYTES RELATIVE PERCENT: 1.8 %
MCH RBC QN AUTO: 29.6 PG
MCHC RBC AUTO-ENTMCNC: 32.2 G/DL
MCV RBC AUTO: 0 FL
MONOCYTES ABSOLUTE: 0.3 /ΜL
MONOCYTES RELATIVE PERCENT: 0.3 %
NEUTROPHILS ABSOLUTE: 2.9 /ΜL
NEUTROPHILS RELATIVE PERCENT: 2.9 %
PLATELET # BLD: 221 K/ΜL
PMV BLD AUTO: 92 FL
RBC # BLD: 4.77 10^6/ΜL
WBC # BLD: 5 10^3/ML

## 2021-05-10 RX ORDER — ATORVASTATIN CALCIUM 10 MG/1
10 TABLET, FILM COATED ORAL DAILY
Qty: 133 TABLET | Refills: 0 | Status: SHIPPED | OUTPATIENT
Start: 2021-05-10 | End: 2021-08-23 | Stop reason: SDUPTHER

## 2021-05-10 RX ORDER — LEVETIRACETAM 500 MG/1
500 TABLET ORAL 2 TIMES DAILY
Qty: 266 TABLET | Refills: 0 | Status: SHIPPED | OUTPATIENT
Start: 2021-05-10 | End: 2021-09-20 | Stop reason: SDUPTHER

## 2021-05-10 NOTE — TELEPHONE ENCOUNTER
Pt hasn't been seen since 4/2020 pt called and stated he needed refills I pended the medication with just enough for his next appt 9/20/21.     Malini Rainey

## 2021-08-23 RX ORDER — ATORVASTATIN CALCIUM 10 MG/1
10 TABLET, FILM COATED ORAL DAILY
Qty: 30 TABLET | Refills: 0 | Status: SHIPPED | OUTPATIENT
Start: 2021-08-23 | End: 2021-09-20 | Stop reason: SDUPTHER

## 2021-09-20 ENCOUNTER — OFFICE VISIT (OUTPATIENT)
Dept: NEUROLOGY | Age: 21
End: 2021-09-20
Payer: COMMERCIAL

## 2021-09-20 VITALS
HEIGHT: 74 IN | WEIGHT: 158 LBS | BODY MASS INDEX: 20.28 KG/M2 | SYSTOLIC BLOOD PRESSURE: 122 MMHG | HEART RATE: 104 BPM | DIASTOLIC BLOOD PRESSURE: 86 MMHG | OXYGEN SATURATION: 95 %

## 2021-09-20 DIAGNOSIS — I63.9 CRYPTOGENIC STROKE (HCC): Primary | ICD-10-CM

## 2021-09-20 DIAGNOSIS — Q87.40 MARFAN'S SYNDROME: ICD-10-CM

## 2021-09-20 DIAGNOSIS — F33.1 MDD (MAJOR DEPRESSIVE DISORDER), RECURRENT EPISODE, MODERATE (HCC): ICD-10-CM

## 2021-09-20 DIAGNOSIS — G40.409 OTHER GENERALIZED EPILEPSY, NOT INTRACTABLE, WITHOUT STATUS EPILEPTICUS (HCC): ICD-10-CM

## 2021-09-20 DIAGNOSIS — I63.512 LEFT MIDDLE CEREBRAL ARTERY STROKE (HCC): ICD-10-CM

## 2021-09-20 PROBLEM — G40.909 EPILEPSY (HCC): Status: ACTIVE | Noted: 2021-09-20

## 2021-09-20 PROCEDURE — 99214 OFFICE O/P EST MOD 30 MIN: CPT | Performed by: PSYCHIATRY & NEUROLOGY

## 2021-09-20 RX ORDER — VENLAFAXINE HYDROCHLORIDE 225 MG/1
TABLET, EXTENDED RELEASE ORAL
Qty: 30 TABLET | Refills: 0 | Status: SHIPPED | OUTPATIENT
Start: 2021-09-20 | End: 2021-12-20 | Stop reason: SDUPTHER

## 2021-09-20 RX ORDER — LEVETIRACETAM 500 MG/1
500 TABLET ORAL 2 TIMES DAILY
Qty: 266 TABLET | Refills: 0 | Status: SHIPPED | OUTPATIENT
Start: 2021-09-20 | End: 2021-12-16

## 2021-09-20 RX ORDER — VENLAFAXINE HYDROCHLORIDE 225 MG/1
TABLET, EXTENDED RELEASE ORAL
COMMUNITY
Start: 2021-08-18 | End: 2021-09-20 | Stop reason: SDUPTHER

## 2021-09-20 RX ORDER — ATORVASTATIN CALCIUM 10 MG/1
10 TABLET, FILM COATED ORAL DAILY
Qty: 30 TABLET | Refills: 0 | Status: SHIPPED | OUTPATIENT
Start: 2021-09-20 | End: 2021-11-23

## 2021-09-20 ASSESSMENT — ENCOUNTER SYMPTOMS
VOMITING: 0
COLOR CHANGE: 0
TROUBLE SWALLOWING: 0
NAUSEA: 0
CHOKING: 0
BACK PAIN: 0
PHOTOPHOBIA: 0
SHORTNESS OF BREATH: 0

## 2021-09-20 NOTE — PROGRESS NOTES
Subjective:      Patient ID: Artis Trevino is a 19 y.o. male who presents today for:  Chief Complaint   Patient presents with    Follow-up     Pt states he hasnt had any side effects from the stroke he had a few years ago pt states you prescribe him Efferxor and would liike to know if you can give him capsules instead of tablets. HPI 78-year-old right-handed gentleman now with a history of seizure disorder with Marfan syndrome. Patient also is on Effexor. Patient actually had a cryptogenic stroke secondary to Marfan syndrome. He is on aspirin. Is doing otherwise well. 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 Recommended no active sports as he is a high risk for dissections and other injuries    Past Medical History:   Diagnosis Date    Appendicitis     Depression      Past Surgical History:   Procedure Laterality Date    APPENDECTOMY      HERNIA REPAIR       Social History     Socioeconomic History    Marital status: Single     Spouse name: Not on file    Number of children: Not on file    Years of education: Not on file    Highest education level: Not on file   Occupational History    Not on file   Tobacco Use    Smoking status: Current Every Day Smoker     Types: E-Cigarettes    Smokeless tobacco: Current User   Substance and Sexual Activity    Alcohol use: Yes    Drug use: Yes     Types: Marijuana    Sexual activity: Not on file   Other Topics Concern    Not on file   Social History Narrative    Not on file     Social Determinants of Health     Financial Resource Strain:     Difficulty of Paying Living Expenses:    Food Insecurity:     Worried About Running Out of Food in the Last Year:     [address] in the Last Year:    Transportation Needs:     Lack of Transportation (Medical):      Lack of Transportation (Non-Medical):    Physical Activity:     Days of Exercise per Week:     Minutes of Exercise per Session:    Stress:     Feeling of Stress :    Social Connections:     Frequency of Communication with Friends and Family:     Frequency of Social Gatherings with Friends and Family:     Attends Mandaen Services:     Active Member of Clubs or Organizations:     Attends Club or Organization Meetings:     Marital Status:    Intimate Partner Violence:     Fear of Current or Ex-Partner:     Emotionally Abused:     Physically Abused:     Sexually Abused:      Family History   Family history unknown: Yes     No Known Allergies    Current Outpatient Medications   Medication Sig Dispense Refill    atorvastatin (LIPITOR) 10 MG tablet Take 1 tablet by mouth daily TAKE 1 (ONE) TABLET BY MOUTH NIGHTLY 30 tablet 0    venlafaxine 225 MG extended release tablet TAKE 1 TABLET BY MOUTH ONCE DAILY 30 tablet 0    levETIRAcetam (KEPPRA) 500 MG tablet Take 1 tablet by mouth 2 times daily TAKE 1 TABLET BY MOUTH TWICE DAILY 266 tablet 0    aspirin 325 MG EC tablet Take 1 tablet by mouth daily 30 tablet 3     No current facility-administered medications for this visit. Review of Systems   Constitutional: Negative for fever. HENT: Negative for ear pain, tinnitus and trouble swallowing. Eyes: Negative for photophobia and visual disturbance. Respiratory: Negative for choking and shortness of breath. Cardiovascular: Negative for chest pain and palpitations. Gastrointestinal: Negative for nausea and vomiting. Musculoskeletal: Negative for back pain, gait problem, joint swelling, myalgias, neck pain and neck stiffness. Skin: Negative for color change. Allergic/Immunologic: Negative for food allergies. Neurological: Negative for dizziness, tremors, seizures, syncope, facial asymmetry, speech difficulty, weakness, light-headedness, numbness and headaches.    Psychiatric/Behavioral: Negative for behavioral Cecilia Otero MD Procedure Type of Study  DEBORAH procedure:ECHOCARDIOGRAM TRANSESOPHAGEAL . Procedure Date Date: 12/06/2018 Start: 11:56 AM Study Location: Cath Lab Technical Quality: Adequate visualization Patient Status: Routine Height: 73 inches Weight: 141 pounds BSA: 1.85 m^2 BMI: 18.6 kg/m^2 BP: 102/53 mmHg  Conclusions  Summary  Normal LV and RV. Redundant mitral and tricuspid valve leaflets but no regurgitation. Normal estimated PA pressure. Small PFO by color with few bubbles by valsalva. Too small for closure. Late bubbles indicate possible pulmonary shunt. Signature  ----------------------------------------------------------------  Electronically signed by Cecilia Otero MD(Interpreting  physician) on 12/12/2018 08:27 AM  ----------------------------------------------------------------  Findings Left Ventricle Left ventricular ejection fraction is visually estimated at 60%. Normal left ventricular size and function. Normal diastolic filling. Right Ventricle Normal right ventricle structure and function. Normal right ventricle systolic pressure. Left Atrium Normal left atrium. Right Atrium Normal right atrium. Mitral Valve Redundant mitral leaflets with trivial regurgitation. Tricuspid Valve Redundant tricuspid valve leaflets with no regurgitation Aortic Valve Normal aortic valve structure and function. Pulmonic Valve Normal pulmonic valve structure and function. Pericardial Effusion No evidence of pericardial effusion. Aorta \ Miscellaneous The aorta is within normal limits. Very small PFO with mostly left to right shunt by color, very few bubbles cross right to left and only with valsalva. Some late bubbles seen possible small pulmonary shunt. CT CHEST W WO CONTRAST    Result Date: 12/5/2018  Study: CT scan of the chest without with contrast Reason for exam: Embolic stroke. Abnormal physical exam findings suggestive of Marfan's syndrome. Findings: Unenhanced scans were obtained. Postcontrast images were included with the patient receiving 100 mL of Isovue-300. No significant dilatation of the thoracic aorta or pulmonary outflow track. The ascending thoracic aorta is 2.5 cm in diameter and the pulmonary outflow track is 2.8 cm in diameter. Following the injection of contrast there is no intraluminal abnormality  in the aorta to suggest dissection. Unenhanced study reveals no suspicious calcification of the heart valves. No significant cardiomegaly appreciated. No pericardial effusion. No suspicious mediastinal mass seen. Lung windows reveal no consolidating infiltrate or effusion. The extrathoracic soft tissues are intact. Vertebral bodies are normal in height. There is significant straightening of the thoracolumbar spine. There is also very mild dextroscoliosis in the dorsal spine and compensatory levoscoliosis in the lumbar spine. There is pectus excavatum deformity noted. These bone abnormalities have been associated with Marfan syndrome, however with the lack of other findings these bone changes are likely an incidental observation. NO EVIDENCE OF THORACIC AORTIC ANEURYSM, PULMONARY OUTFLOW DILATATION, OR CALCIFIED VALVULAR HEART DISEASE. NO ACUTE PROCESS IN THE LUNG CABRERA. ? ? INCIDENTAL SCOLIOSIS AND PECTUS EXCAVATUM DEFORMITY LIKELY. All CT scans at this facility use dose modulation, iterative reconstruction, and/or weight based dosing when appropriate to reduce radiation dose to as low as reasonably achievable. CT ABDOMEN W CONTRAST Additional Contrast? None    Result Date: 12/5/2018  Study: CT abdomen without with contrast Reason for exam: Abnormal physical exam findings. Recent CVA. Evaluate for Marfan syndrome. Findings: Unenhanced scans were followed by an enhanced study. No additional IV contrast was given. Please see CT chest report. The abdominal aorta is normal in caliber. There is normal opacification of the abdominal aorta and its major branches in the abdomen. Arterial flow is widely patent to the level of the iliac vasculature, the lower limits of the study. The liver is normal in appearance. The spleen is moderately enlarged measuring 15 to 16 cm in length with the splenic index of 684. Pancreas is normal. No inflammation in the epigastric soft tissues. Stomach unremarkable. Gallbladder and biliary tree within normal limits. There is prompt uptake and excretion by the kidneys. No suspicious renal mass. No retroperitoneal mass or fluid collection. Large and small bowel unremarkable. No evidence of bowel obstruction. Bones grossly intact. Mild levoscoliosis in the lumbar spine noted. MILD SPLENOMEGALY. ABDOMINAL AORTA, ABDOMINAL SOLID VISCERA, AND BOWEL UNREMARKABLE. All CT scans at this facility use dose modulation, iterative reconstruction, and/or weight based dosing when appropriate to reduce radiation dose to as low as reasonably achievable. MRA NECK W WO CONTRAST    Result Date: 12/5/2018  EXAMINATION: MRA HEAD W WO CONTRAST, MRA NECK W WO CONTRAST, 12/5/2018 1:30 PM HISTORY:  24-year-old male, hemiplegia, suspect aneurysm COMPARISON:  MRI brain December 5, 2018 TECHNIQUE: Time-of-flight images through the [place] and neck were performed without contrast for MRA head and neck followed by with contrast imaging. FINDINGS: MRA BRAIN: Anterior cerebral arteries: No stenosis or malformation. Expected distal arborization pattern. There is an anterior communicating artery. Middle cerebral arteries: No stenosis, aneurysm or malformation. Expected distal arborization pattern. Vertebrobasilar circulation: No stenosis, aneurysm or malformation. Expected distal arborization pattern. MRA NECK: Right carotid artery: No stenosis, dissection or malformation. Right vertebral artery: No stenosis, dissection or malformation. Left carotid artery: No stenosis, dissection or malformation. Left vertebral artery: No stenosis, dissection or malformation.  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 The dural venous sinuses opacify normally on postcontrast images. Asymmetric left decreased signal intensity in the left insula corresponding to the area of restricted diffusion seen on comparison MRI. There is symmetric enhancement of blood vessels in the sylvian fissures. Normal appearance of the vessels in the neck and [place]. No stenosis, aneurysm, dissection or malformation. MRI BRAIN WO CONTRAST    Result Date: 12/5/2018  EXAMINATION:MRI BRAIN WO CONTRAST CLINICAL HISTORY: EPILEPSY TECHNIQUE:  Unenhanced scans were obtained. T1 and T2 weighted sequences along with FLAIR and diffusion weighted imaging and gradient echo axial sequences were acquired. FINDINGS: Study reveals restricted diffusion with signal dropout on ADC sequence and associated susceptibility artifact in the left anterior insular cortex. There is a second area of edema in the left parietal lobe. There is relative sparing of the white matter. Differential diagnosis would be a infarct with petechial hemorrhage versus encephalitis with associated hemorrhage. Herpes infection is a consideration. Edema status post brain seizure is not commonly associated with hemorrhage. There is no considerable mass effect No evidence of significant demyelinating disease. No associated atrophy. There is normal signal void in the cerebral vasculature at the level of the [place]. Inflammatory changes in the left maxillary sinus noted where a 2.8 cm polyp or retention cyst is present. No air-fluid levels in the sinuses or mastoids. LEFT INSULAR INFARCT WITH PETECHIAL HEMORRHAGE SUSPECTED. EMBOLIC PHENOMENA A CONSIDERATION. ENCEPHALITIS AND POSSIBLE HERPES INFECTION, THOUGH LESS LIKELY, CANNOT BE DISMISSED. FINDINGS DISCUSSED WITH DR. DARÍO WANG VIA TELEPHONE AT THE TIME OF THIS DICTATION.     MRA HEAD W WO CONTARST    Result Date: 12/5/2018  EXAMINATION: MRA HEAD W WO CONTRAST, MRA NECK W WO CONTRAST, 12/5/2018 1:30 PM HISTORY:  49-year-old male, hemiplegia, suspect aneurysm COMPARISON:  MRI brain December 5, 2018 TECHNIQUE: Time-of-flight images through the [place] and neck were performed without contrast for MRA head and neck followed by with contrast imaging. FINDINGS: MRA BRAIN: Anterior cerebral arteries: No stenosis or malformation. Expected distal arborization pattern. There is an anterior communicating artery. Middle cerebral arteries: No stenosis, aneurysm or malformation. Expected distal arborization pattern. Vertebrobasilar circulation: No stenosis, aneurysm or malformation. Expected distal arborization pattern. MRA NECK: Right carotid artery: No stenosis, dissection or malformation. Right vertebral artery: No stenosis, dissection or malformation. Left carotid artery: No stenosis, dissection or malformation. Left vertebral artery: No stenosis, dissection or malformation. 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 The dural venous sinuses opacify normally on postcontrast images. Asymmetric left decreased signal intensity in the left insula corresponding to the area of restricted diffusion seen on comparison MRI. There is symmetric enhancement of blood vessels in the sylvian fissures. Normal appearance of the vessels in the neck and [place]. No stenosis, aneurysm, dissection or malformation.        Lab Results   Component Value Date    WBC 6.5 12/07/2018    RBC 4.28 12/07/2018    HGB 13.0 12/07/2018    HCT 37.5 12/07/2018    MCV 87.6 12/07/2018    MCH 30.4 12/07/2018    MCHC 34.8 12/07/2018    RDW 13.1 12/07/2018     12/07/2018     Lab Results   Component Value Date     12/07/2018    K 3.8 12/07/2018     12/07/2018    CO2 23 12/07/2018    BUN 8 12/07/2018    CREATININE 0.78 12/07/2018    GFRAA >60.0 12/07/2018    LABGLOM >60.0 12/07/2018    GLUCOSE 86 12/07/2018    PROT 9.0 12/04/2018    LABALBU 5.7 12/04/2018    CALCIUM 8.3 12/07/2018    BILITOT 1.1 12/04/2018    ALKPHOS 45 12/04/2018    AST 21 12/04/2018    ALT 14 12/04/2018     Lab Results   Component Value Date    PROTIME 12.0 12/05/2018    INR 1.2 12/05/2018     Lab Results   Component Value Date    TSH 1.940 12/04/2018     Lab Results   Component Value Date    TRIG 89 12/07/2018    HDL 28 12/07/2018    LDLCALC 66 12/07/2018     Lab Results   Component Value Date    LABAMPH Neg 12/05/2018    BARBSCNU Neg 12/05/2018    LABBENZ Neg 12/05/2018    OPIATESCREENURINE Neg 12/05/2018    PHENCYCLIDINESCREENURINE Neg 12/05/2018    ETOH <10 12/04/2018     No results found for: LITHIUM, DILFRTOT, VALPROATE    Assessment:       Diagnosis Orders   1. Cryptogenic stroke ([facility]     2. MDD (major depressive disorder), recurrent episode, moderate ([address]     3. Marfan's syndrome     4. Left middle cerebral artery stroke (HCC)     5. Other generalized epilepsy, not intractable, without status epilepticus ([facility]     Left cerebral stroke with notable Marfan syndrome patient cryptogenic stroke is doing actually quite well. He has not any other symptoms of the same we continue him on aspirin 325 mg and atorvastatin. Is on Keppra 500 mg twice a day. Patient was made aware that he must do any active sports as he is high risk for dissections and joint injuries and on rare occasions disc ruptures. Patient has major depression which I manage and is on Effexor 225 mg daily which we are managing as well. He has not had any recurrent symptoms of cerebral ischemia. Patient had post stroke seizures as well and is on very low-dose of Keppra at final milligrams twice a day which we will continue. Ports no side effects of mood changes with the same. Has not lost any weight or gained any weight. He has no insomnia. Plan:      No orders of the defined types were placed in this encounter.     Orders Placed This Encounter   Medications    atorvastatin (LIPITOR) 10 MG tablet     Sig: Take 1 tablet by mouth daily TAKE 1 (ONE) TABLET BY MOUTH NIGHTLY     Dispense:  30 tablet     Refill:  0    venlafaxine 225 MG extended release tablet     Sig: TAKE 1 TABLET BY MOUTH ONCE DAILY     Dispense:  30 tablet     Refill:  0    levETIRAcetam (KEPPRA) 500 MG tablet     Sig: Take 1 tablet by mouth 2 times daily TAKE 1 TABLET BY MOUTH TWICE DAILY     Dispense:  266 tablet     Refill:  0       No follow-ups on file.       Myesha Lloyd MD

## 2021-11-10 ENCOUNTER — OFFICE VISIT (OUTPATIENT)
Dept: INTERNAL MEDICINE | Age: 21
End: 2021-11-10
Payer: COMMERCIAL

## 2021-11-10 VITALS
SYSTOLIC BLOOD PRESSURE: 108 MMHG | HEIGHT: 72 IN | WEIGHT: 159 LBS | HEART RATE: 88 BPM | DIASTOLIC BLOOD PRESSURE: 70 MMHG | TEMPERATURE: 98.3 F | BODY MASS INDEX: 21.54 KG/M2 | OXYGEN SATURATION: 98 %

## 2021-11-10 DIAGNOSIS — H66.001 NON-RECURRENT ACUTE SUPPURATIVE OTITIS MEDIA OF RIGHT EAR WITHOUT SPONTANEOUS RUPTURE OF TYMPANIC MEMBRANE: ICD-10-CM

## 2021-11-10 DIAGNOSIS — G40.409 OTHER GENERALIZED EPILEPSY, NOT INTRACTABLE, WITHOUT STATUS EPILEPTICUS (HCC): ICD-10-CM

## 2021-11-10 DIAGNOSIS — F33.1 MDD (MAJOR DEPRESSIVE DISORDER), RECURRENT EPISODE, MODERATE (HCC): Primary | ICD-10-CM

## 2021-11-10 DIAGNOSIS — Q87.40 MARFAN'S SYNDROME: ICD-10-CM

## 2021-11-10 PROBLEM — R41.82 ALTERED MENTAL STATUS: Status: RESOLVED | Noted: 2018-12-04 | Resolved: 2021-11-10

## 2021-11-10 PROCEDURE — 99213 OFFICE O/P EST LOW 20 MIN: CPT | Performed by: NURSE PRACTITIONER

## 2021-11-10 RX ORDER — FLUTICASONE PROPIONATE 50 MCG
2 SPRAY, SUSPENSION (ML) NASAL DAILY
Qty: 16 G | Refills: 1 | Status: SHIPPED | OUTPATIENT
Start: 2021-11-10

## 2021-11-10 RX ORDER — AMOXICILLIN AND CLAVULANATE POTASSIUM 875; 125 MG/1; MG/1
1 TABLET, FILM COATED ORAL 2 TIMES DAILY
Qty: 20 TABLET | Refills: 0 | Status: SHIPPED | OUTPATIENT
Start: 2021-11-10 | End: 2021-11-20

## 2021-11-10 SDOH — ECONOMIC STABILITY: FOOD INSECURITY: WITHIN THE PAST 12 MONTHS, THE FOOD YOU BOUGHT JUST DIDN'T LAST AND YOU DIDN'T HAVE MONEY TO GET MORE.: OFTEN TRUE

## 2021-11-10 SDOH — ECONOMIC STABILITY: FOOD INSECURITY: WITHIN THE PAST 12 MONTHS, YOU WORRIED THAT YOUR FOOD WOULD RUN OUT BEFORE YOU GOT MONEY TO BUY MORE.: OFTEN TRUE

## 2021-11-10 ASSESSMENT — ENCOUNTER SYMPTOMS
VOMITING: 0
SORE THROAT: 1
COUGH: 1
RHINORRHEA: 0

## 2021-11-10 ASSESSMENT — SOCIAL DETERMINANTS OF HEALTH (SDOH): HOW HARD IS IT FOR YOU TO PAY FOR THE VERY BASICS LIKE FOOD, HOUSING, MEDICAL CARE, AND HEATING?: SOMEWHAT HARD

## 2021-11-10 NOTE — PATIENT INSTRUCTIONS
Food and Meal Resources    [employer] of [address] [place] [address]  Call 211 or  www. [employer]    [facility]. [address] [facility])  Call - [phone]  [email or web address]      [employer] / [employer] on [facility]  [address]   [phone]  *regular & [address]. [address] [facility], [address]  [phone]  *regular & special diets  60+ [facility] on [address]  [address] [place] [address]. [place], [facility] Street  [phone], ext. [phone] [facility] on [address] [facility]. [facility], [address] [place] [address]  [phone]  *regular & special diets  [facility], [address] [facility] and [place] only    [facility] on [address] [facility]. [address]  [place], [address] [facility] [address]  [address] [place] [address] Road  [phone]  [facility] 109 only    [address] [place] on [address] [place]. [place], [address] [facility]  [phone]  61 + and/or disables      Family and Housing Resources    [employer] of [address] [place] [address]  Call 211  or - wwwLoadSpring Solutions. [address] [place] [address])  Call - [phone]  [email or web address]. [employer]ial    [facility]  [address] [facility] [address]  [address]  [address]  [phone] /[phone]    Medicaid Application  Https://benefits. [place].gov/  [phone]    Home Energy Assistance Programs (HEAP)  [address] [place], [address] [place] [phone]    [facility] ( shelter)  [address] [facility] [address], [address]    [facility] ([facility]DC)  [address] [facility] [address], [address] [facility]  [address] [facility]  www. [email or web address]. [address] [facility], [facility] Street  [address] for [address] [facility] Learning  [address] [address] 38[address] [facility] at [address] [facility])  Call - [phone]  www.[email or web address]. [employer]    [employer] of [address] [facility]  Call 211 or  [facility] [address] [place], [address] [facility]  [phone]  *adults only with no insurance    [address]  [address] [facility] [address]  [phone]    Medicaid Application  https://benefits. [place].gov/  [phone]      [facility] [place] [id number] [facility] [address], [address] [facility]  [phone]    Patient Assistance Programs(PAP)  www.needWistone. TVplus  *search by medication to see if assistance is available  . lorPatient Education        [place]: Care Instructions  Your Care Instructions     Even though infection is a common cause of ear pain, not all ear pain means an infection. If you have ear pain and don't have an infection, it could be because of a jaw problem, such as temporomandibular joint (TMJ) pain. Or it could be because of a neck problem. When ear discomfort or pain is mild or comes and goes without other symptoms, home treatment may be all you need. Follow-up care is a key part of your treatment and safety. Be sure to make and go to all appointments, and call your doctor if you are having problems. It's also a good idea to know your test results and keep a list of the medicines you take. How can you care for yourself at home? · Apply heat on the ear to ease pain. To apply heat, put a warm water bottle, a heating pad set on low, or a warm cloth on your ear. Do not go to sleep with a heating pad on your skin.   · Take an over-the-counter pain medicine, such as acetaminophen (Tylenol), ibuprofen (Advil, Motrin), or naproxen (Aleve). Be safe with medicines. Read and follow all instructions on the label. · Do not take two or more pain medicines at the same time unless the doctor told you to. Many pain medicines have acetaminophen, which is Tylenol. Too much acetaminophen (Tylenol) can be harmful. · Never insert anything, such as a cotton swab or a rufino pin, into the ear. When should you call for help? Call your doctor now or seek immediate medical care if:    · You have new or worse symptoms of infection, such as:  ? Increased pain, swelling, warmth, or redness. ? Red streaks leading from the area. ? Pus draining from the area. ? A fever. Watch closely for changes in your health, and be sure to contact your doctor if:    · You have new or worse discharge coming from the ear.     · You do not get better as expected. Where can you learn more? Go to [email or web address] [email or web address] org and sign in to your Glanse account. Enter P669 in the GenieBelt box to learn more about \"[place]: Care Instructions. \"     If you do not have an account, please click on the \"Sign Up Now\" link. Current as of: December 2, 2020               Content Version: 13.0  © 2006-2021 Healthwise, Incorporated. Care instructions adapted under license by [facility] ([facility]). If you have questions about a medical condition or this instruction, always ask your healthcare professional. [place] [address] any warranty or liability for your use of this information.

## 2021-11-10 NOTE — PROGRESS NOTES
Jimmy Pastor (:  2000) is a 19 y.o. male,Established patient, here for evaluation of the following chief complaint(s):  Otalgia (started on left, which is better now. yesterday right ear pain was very bad. used ear drop that helped after awhile. )    Pt had a sore throat last Tuesday, that got worse over the week that has fully resolved. 2 days ago had pain in left ear, then yesterday pain was in his right ear. Had severe pain like someone was stabbing in his ear with a needle. Used otc equate pain relief ear drops which did give some relief. Today still feels pressure, but not much pain. Has dry cough but feels is related to his recent move- was already present. No sick contacts. No loss of taste or smell. Depression: feels is doing ok. Likes where is at mentally. Work is good, still stressful d/t his boss, he is not nice. 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 ASSESSMENT/PLAN:  1. MDD (major depressive disorder), recurrent episode, moderate (HCC)   -Chronic, stable   -Continue venlafaxine unchanged   -rtc if symptoms worsen or feel  less controlled  2. Non-recurrent acute suppurative otitis media of right ear without spontaneous rupture of tympanic membrane   -new, uncontrolled   -start augmentin and flonase   -enc to take augmentin with  food, probiotic like yogurt while  on med. Complete whole  regimen even if feeling better   3. Other generalized epilepsy, not intractable, without status epilepticus ([facility]   -chronic, stable   -sees Dr. Kanu Alvarez neuro thru [facility]   -continue keppra, reviewed neuro last note     4. Marfan's syndrome   -chronic stable   -monitored by neuro, cause of his  stroke few yrs ago   -continue statin and asa, chronically    Return in about 6 months (around 5/10/2022) for anxiety/depression recheck. Subjective   SUBJECTIVE/OBJECTIVE:  Otalgia   There is pain in both ears. This is a new problem. The current episode started in the past 7 days.  The problem occurs Effort: Pulmonary effort is normal.      Breath sounds: Normal breath sounds. Musculoskeletal:      Cervical back: Normal range of motion and neck supple. No tenderness. Lymphadenopathy:      Cervical: No cervical adenopathy. Neurological:      Mental Status: He is alert. Psychiatric:         Mood and Affect: Mood normal.         Behavior: Behavior normal.            On this date 11/10/2021 I have spent 18 minutes reviewing previous notes, test results and face to face with the patient discussing the diagnosis and importance of compliance with the treatment plan as well as documenting on the day of the visit. An electronic signature was used to authenticate this note.     --Shannon Stone, KARLA - CNP

## 2021-11-23 RX ORDER — ATORVASTATIN CALCIUM 10 MG/1
TABLET, FILM COATED ORAL
Qty: 30 TABLET | Refills: 0 | Status: SHIPPED | OUTPATIENT
Start: 2021-11-23 | End: 2021-12-20

## 2021-11-23 NOTE — TELEPHONE ENCOUNTER
Pharmacy  requesting medication refill.  Please approve or deny this request.    Rx requested:  Requested Prescriptions     Pending Prescriptions Disp Refills    atorvastatin (LIPITOR) 10 MG tablet [Pharmacy Med Name: Atorvastatin Calcium 10 MG Oral Tablet] 30 tablet 0     Sig: TAKE 1 TABLET BY MOUTH ONCE DAILY NIGHTLY         Last Office Visit:   9/20/2021      Next Visit Date:  Future Appointments   Date Time Provider Mikhail Nunes   3/21/2022  3:15 PM Deanna Nix MD [facility]   5/11/2022 10:00 AM [address] [place] [address]

## 2021-12-16 RX ORDER — LEVETIRACETAM 500 MG/1
TABLET ORAL
Qty: 60 TABLET | Refills: 3 | Status: SHIPPED | OUTPATIENT
Start: 2021-12-16 | End: 2022-03-21 | Stop reason: SDUPTHER

## 2021-12-16 NOTE — TELEPHONE ENCOUNTER
Pharmacy is requesting medication refill.  Please approve or deny this request.    Rx requested:  Requested Prescriptions     Pending Prescriptions Disp Refills    levETIRAcetam (KEPPRA) 500 MG tablet [Pharmacy Med Name: levETIRAcetam 500 MG Oral Tablet] 60 tablet 3     Sig: Take 1 tablet by mouth twice daily         Last Office Visit:   9/20/2021      Next Visit Date:  Future Appointments   Date Time Provider Mikhail Nunes   3/21/2022  3:15 PM Pricilla Connelly MD [facility]   5/11/2022 10:00 AM [address] [place] [address]

## 2021-12-20 RX ORDER — ATORVASTATIN CALCIUM 10 MG/1
TABLET, FILM COATED ORAL
Qty: 30 TABLET | Refills: 2 | Status: SHIPPED | OUTPATIENT
Start: 2021-12-20 | End: 2022-03-21 | Stop reason: SDUPTHER

## 2021-12-20 RX ORDER — VENLAFAXINE HYDROCHLORIDE 225 MG/1
TABLET, EXTENDED RELEASE ORAL
Qty: 30 TABLET | Refills: 3 | Status: SHIPPED | OUTPATIENT
Start: 2021-12-20 | End: 2022-04-06

## 2021-12-20 NOTE — TELEPHONE ENCOUNTER
Comments:      Last Office Visit (last PCP visit):   11/10/2021    Next Visit Date:  Future Appointments   Date Time Provider Mikhail Nunes   3/21/2022  3:15 PM Sherri Clay MD [facility]   5/11/2022 10:00 AM KARLA Esparza - GENI [facility]       **If hasn't been seen in over a year OR hasn't followed up according to last diabetes/ADHD visit, make appointment for patient before sending refill to provider.     Rx requested:  Requested Prescriptions     Pending Prescriptions Disp Refills    venlafaxine 225 MG extended release tablet 30 tablet 0     Sig: TAKE 1 TABLET BY MOUTH ONCE DAILY

## 2021-12-20 NOTE — TELEPHONE ENCOUNTER
Pharmacy is requesting medication refill. Please approve or deny this request.    Rx requested:  Requested Prescriptions     Pending Prescriptions Disp Refills    atorvastatin (LIPITOR) 10 MG tablet [Pharmacy Med Name: Atorvastatin Calcium 10 MG Oral Tablet] 30 tablet 2     Sig: TAKE 1 TABLET BY MOUTH ONCE DAILY NIGHTLY .  APPOINTMENT REQUIRED FOR FUTURE REFILLS         Last Office Visit:   9/20/2021      Next Visit Date:  Future Appointments   Date Time Provider Mikhail Nunes   3/21/2022  3:15 PM Pricilla Connelly MD [facility]   5/11/2022 10:00 AM [address] [place] [address]

## 2022-01-03 ENCOUNTER — HOSPITAL ENCOUNTER (EMERGENCY)
Age: 22
Discharge: HOME | End: 2022-01-03
Attending: EMERGENCY MEDICINE
Payer: COMMERCIAL

## 2022-01-03 VITALS
TEMPERATURE: 99.4 F | RESPIRATION RATE: 16 BRPM | WEIGHT: 160 LBS | HEIGHT: 74 IN | SYSTOLIC BLOOD PRESSURE: 120 MMHG | BODY MASS INDEX: 20.53 KG/M2 | DIASTOLIC BLOOD PRESSURE: 75 MMHG | OXYGEN SATURATION: 97 % | HEART RATE: 89 BPM

## 2022-01-03 DIAGNOSIS — B34.9 VIRAL ILLNESS: ICD-10-CM

## 2022-01-03 DIAGNOSIS — S39.012A STRAIN OF LUMBAR REGION, INITIAL ENCOUNTER: Primary | ICD-10-CM

## 2022-01-03 DIAGNOSIS — R50.9 FEBRILE ILLNESS, ACUTE: ICD-10-CM

## 2022-01-03 LAB
ALBUMIN SERPL-MCNC: 5 G/DL (ref 3.5–4.6)
ALP BLD-CCNC: 51 U/L (ref 35–104)
ALT SERPL-CCNC: 28 U/L (ref 0–41)
AMPHETAMINE SCREEN, URINE: ABNORMAL
ANION GAP SERPL CALCULATED.3IONS-SCNC: 12 MEQ/L (ref 9–15)
AST SERPL-CCNC: 32 U/L (ref 0–40)
BARBITURATE SCREEN URINE: ABNORMAL
BASOPHILS ABSOLUTE: 0 K/UL (ref 0–0.1)
BASOPHILS RELATIVE PERCENT: 0.3 % (ref 0.2–1.2)
BENZODIAZEPINE SCREEN, URINE: ABNORMAL
BILIRUB SERPL-MCNC: 0.7 MG/DL (ref 0.2–0.7)
BILIRUBIN URINE: NEGATIVE
BLOOD, URINE: NEGATIVE
BUN BLDV-MCNC: 4 MG/DL (ref 6–20)
CALCIUM SERPL-MCNC: 9.3 MG/DL (ref 8.5–9.9)
CANNABINOID SCREEN URINE: POSITIVE
CHLORIDE BLD-SCNC: 100 MEQ/L (ref 95–107)
CLARITY: CLEAR
CO2: 26 MEQ/L (ref 20–31)
COCAINE METABOLITE SCREEN URINE: ABNORMAL
COLOR: YELLOW
CREAT SERPL-MCNC: 0.82 MG/DL (ref 0.7–1.2)
EOSINOPHILS ABSOLUTE: 0 K/UL (ref 0–0.5)
EOSINOPHILS RELATIVE PERCENT: 0.6 % (ref 0.8–7)
GFR AFRICAN AMERICAN: >60
GFR NON-AFRICAN AMERICAN: >60
GLOBULIN: 2.9 G/DL (ref 2.3–3.5)
GLUCOSE BLD-MCNC: 124 MG/DL (ref 70–99)
GLUCOSE URINE: NEGATIVE MG/DL
HCT VFR BLD CALC: 44 % (ref 42–52)
HEMOGLOBIN: 14.7 G/DL (ref 13.7–17.5)
IMMATURE GRANULOCYTES #: 0 K/UL
IMMATURE GRANULOCYTES %: 0.3 %
KETONES, URINE: NEGATIVE MG/DL
LEUKOCYTE ESTERASE, URINE: NEGATIVE
LYMPHOCYTES ABSOLUTE: 0.2 K/UL (ref 1.3–3.6)
LYMPHOCYTES RELATIVE PERCENT: 3.4 %
Lab: ABNORMAL
MCH RBC QN AUTO: 30 PG (ref 25.7–32.2)
MCHC RBC AUTO-ENTMCNC: 33.4 % (ref 32.3–36.5)
MCV RBC AUTO: 89.8 FL (ref 79–92.2)
MONOCYTES ABSOLUTE: 0.4 K/UL (ref 0.3–0.8)
MONOCYTES RELATIVE PERCENT: 6.9 % (ref 5.3–12.2)
NEUTROPHILS ABSOLUTE: 5.5 K/UL (ref 1.8–5.4)
NEUTROPHILS RELATIVE PERCENT: 88.5 % (ref 34–67.9)
NITRITE, URINE: NEGATIVE
OPIATE SCREEN URINE: ABNORMAL
PDW BLD-RTO: 12.2 % (ref 11.6–14.4)
PH UA: 7 (ref 5–9)
PHENCYCLIDINE SCREEN URINE: ABNORMAL
PLATELET # BLD: 169 K/UL (ref 163–337)
POTASSIUM SERPL-SCNC: 3.9 MEQ/L (ref 3.4–4.9)
PROTEIN UA: NEGATIVE MG/DL
RBC # BLD: 4.9 M/UL (ref 4.63–6.08)
SODIUM BLD-SCNC: 138 MEQ/L (ref 135–144)
SPECIFIC GRAVITY UA: 1.01 (ref 1–1.03)
TOTAL PROTEIN: 7.9 G/DL (ref 6.3–8)
TRICYCLIC, URINE: ABNORMAL
URINE REFLEX TO CULTURE: NORMAL
UROBILINOGEN, URINE: 0.2 E.U./DL
WBC # BLD: 6.3 K/UL (ref 4.2–9)

## 2022-01-03 PROCEDURE — 2580000003 HC RX 258: Performed by: EMERGENCY MEDICINE

## 2022-01-03 PROCEDURE — 81003 URINALYSIS AUTO W/O SCOPE: CPT

## 2022-01-03 PROCEDURE — 80053 COMPREHEN METABOLIC PANEL: CPT

## 2022-01-03 PROCEDURE — 6360000002 HC RX W HCPCS: Performed by: EMERGENCY MEDICINE

## 2022-01-03 PROCEDURE — 96374 THER/PROPH/DIAG INJ IV PUSH: CPT

## 2022-01-03 PROCEDURE — 36415 COLL VENOUS BLD VENIPUNCTURE: CPT

## 2022-01-03 PROCEDURE — 85025 COMPLETE CBC W/AUTO DIFF WBC: CPT

## 2022-01-03 PROCEDURE — 80306 DRUG TEST PRSMV INSTRMNT: CPT

## 2022-01-03 PROCEDURE — 99284 EMERGENCY DEPT VISIT MOD MDM: CPT

## 2022-01-03 RX ORDER — 0.9 % SODIUM CHLORIDE 0.9 %
1000 INTRAVENOUS SOLUTION INTRAVENOUS ONCE
Status: COMPLETED | OUTPATIENT
Start: 2022-01-03 | End: 2022-01-03

## 2022-01-03 RX ORDER — IBUPROFEN 600 MG/1
600 TABLET ORAL EVERY 8 HOURS PRN
Qty: 30 TABLET | Refills: 0 | Status: SHIPPED | OUTPATIENT
Start: 2022-01-03 | End: 2022-07-21 | Stop reason: ALTCHOICE

## 2022-01-03 RX ORDER — KETOROLAC TROMETHAMINE 30 MG/ML
30 INJECTION, SOLUTION INTRAMUSCULAR; INTRAVENOUS ONCE
Status: COMPLETED | OUTPATIENT
Start: 2022-01-03 | End: 2022-01-03

## 2022-01-03 RX ADMIN — SODIUM CHLORIDE 1000 ML: 9 INJECTION, SOLUTION INTRAVENOUS at 07:51

## 2022-01-03 RX ADMIN — KETOROLAC TROMETHAMINE 30 MG: 30 INJECTION, SOLUTION INTRAMUSCULAR; INTRAVENOUS at 07:51

## 2022-01-03 ASSESSMENT — ENCOUNTER SYMPTOMS
BACK PAIN: 1
EYE REDNESS: 0
CHOKING: 0
EYE DISCHARGE: 0
TROUBLE SWALLOWING: 0
FACIAL SWELLING: 0
WHEEZING: 0
DIARRHEA: 0
SINUS PRESSURE: 0
BLOOD IN STOOL: 0
STRIDOR: 0
SORE THROAT: 0
SHORTNESS OF BREATH: 0
EYE PAIN: 0
VOMITING: 0
COUGH: 0
VOICE CHANGE: 0
CONSTIPATION: 0
ABDOMINAL PAIN: 0
CHEST TIGHTNESS: 0

## 2022-01-03 ASSESSMENT — PAIN SCALES - GENERAL
PAINLEVEL_OUTOF10: 7
PAINLEVEL_OUTOF10: 7

## 2022-01-03 ASSESSMENT — PAIN DESCRIPTION - LOCATION: LOCATION: HIP;BACK

## 2022-01-03 ASSESSMENT — PAIN DESCRIPTION - PAIN TYPE: TYPE: ACUTE PAIN

## 2022-01-03 ASSESSMENT — PAIN DESCRIPTION - ORIENTATION: ORIENTATION: LOWER

## 2022-01-03 NOTE — Clinical Note
Doron Silvestre was seen and treated in our emergency department on 1/3/2022. He may return to work on 01/06/2022. If you have any questions or concerns, please don't hesitate to call.       Deven Uriarte MD

## 2022-01-03 NOTE — ED NOTES
Pt comes to the ED with c/o awakening with bilateral hip and lower back pain. Denies injury. Pt states he is also having symptoms similar to previous antidepressant withdrawal (dizziness, confusion, lethargy). Pt states he has been compliant with all his home medications. Pt ambulatory into the ER. Pt is A&Ox4, respirations even and unlabored. NAD at this time.       Vinay Cantu RN  01/03/22 [id number]

## 2022-01-03 NOTE — Clinical Note
Terri Aggarwal was seen and treated in our emergency department on 1/3/2022. He may return to work on 01/05/2022. If you have any questions or concerns, please don't hesitate to call.       Kristen Singh MD

## 2022-01-24 ENCOUNTER — OFFICE VISIT (OUTPATIENT)
Dept: INTERNAL MEDICINE | Age: 22
End: 2022-01-24
Payer: COMMERCIAL

## 2022-01-24 VITALS
TEMPERATURE: 98 F | BODY MASS INDEX: 19.77 KG/M2 | HEIGHT: 75 IN | WEIGHT: 159 LBS | OXYGEN SATURATION: 98 % | DIASTOLIC BLOOD PRESSURE: 74 MMHG | SYSTOLIC BLOOD PRESSURE: 110 MMHG | HEART RATE: 88 BPM

## 2022-01-24 DIAGNOSIS — S39.012A STRAIN OF LUMBAR REGION, INITIAL ENCOUNTER: ICD-10-CM

## 2022-01-24 DIAGNOSIS — F33.1 MDD (MAJOR DEPRESSIVE DISORDER), RECURRENT EPISODE, MODERATE (HCC): ICD-10-CM

## 2022-01-24 DIAGNOSIS — Z20.822 SUSPECTED COVID-19 VIRUS INFECTION: Primary | ICD-10-CM

## 2022-01-24 PROCEDURE — 99213 OFFICE O/P EST LOW 20 MIN: CPT | Performed by: NURSE PRACTITIONER

## 2022-01-24 PROCEDURE — 1111F DSCHRG MED/CURRENT MED MERGE: CPT | Performed by: NURSE PRACTITIONER

## 2022-01-24 ASSESSMENT — PATIENT HEALTH QUESTIONNAIRE - PHQ9
6. FEELING BAD ABOUT YOURSELF - OR THAT YOU ARE A FAILURE OR HAVE LET YOURSELF OR YOUR FAMILY DOWN: 0
2. FEELING DOWN, DEPRESSED OR HOPELESS: 0
9. THOUGHTS THAT YOU WOULD BE BETTER OFF DEAD, OR OF HURTING YOURSELF: 0
3. TROUBLE FALLING OR STAYING ASLEEP: 0
7. TROUBLE CONCENTRATING ON THINGS, SUCH AS READING THE NEWSPAPER OR WATCHING TELEVISION: 0
10. IF YOU CHECKED OFF ANY PROBLEMS, HOW DIFFICULT HAVE THESE PROBLEMS MADE IT FOR YOU TO DO YOUR WORK, TAKE CARE OF THINGS AT HOME, OR GET ALONG WITH OTHER PEOPLE: 0
SUM OF ALL RESPONSES TO PHQ QUESTIONS 1-9: 0
4. FEELING TIRED OR HAVING LITTLE ENERGY: 0
5. POOR APPETITE OR OVEREATING: 0
SUM OF ALL RESPONSES TO PHQ QUESTIONS 1-9: 0
SUM OF ALL RESPONSES TO PHQ QUESTIONS 1-9: 0
8. MOVING OR SPEAKING SO SLOWLY THAT OTHER PEOPLE COULD HAVE NOTICED. OR THE OPPOSITE, BEING SO FIGETY OR RESTLESS THAT YOU HAVE BEEN MOVING AROUND A LOT MORE THAN USUAL: 0
SUM OF ALL RESPONSES TO PHQ QUESTIONS 1-9: 0

## 2022-01-24 ASSESSMENT — ENCOUNTER SYMPTOMS
RESPIRATORY NEGATIVE: 1
DIARRHEA: 0
BLOOD IN STOOL: 0
WHEEZING: 0
VOMITING: 0
ABDOMINAL PAIN: 0
CONSTIPATION: 0
CHEST TIGHTNESS: 0
SHORTNESS OF BREATH: 0
NAUSEA: 0

## 2022-01-24 NOTE — PROGRESS NOTES
[address] PRIMARY CARE  [address] [place] [address]  Dept: [phone]  Dept Fax: [phone]: [phone]     CAROL ANN Albert (: 2000) is a 23 y.o. male, Established patient, here for evaluation of the following chief complaint(s):  Follow-Up from Hospital (from ED states pos COVID )      PCP:  Temo Sanchez, KARLA - CNP      Pt here today for ER f/u from 1/3 when was seen at [facility] in [place] because he woke with severe back pain and tunnel vision. Just didn't feel right. Was given IVFs and had some blood work and sent home. He assumes had covid19, but was not tested in ER and did not do a home test kit. Symptoms lasted few weeks, just now feeling better. He was sleeping 17 hrs a day, could only stay awake couple hours at a time. Has more energy now. Had sinus congestion and nose bleeds. Loss of appetite. Has been off of work since 2022. Plans to go back tomorrow 22. Works at [employer] in [place]. Needs a letter saying can return to work. Review of Systems   Constitutional: Positive for fatigue. Negative for unexpected weight change. Respiratory: Negative. Negative for chest tightness, shortness of breath and wheezing. Cardiovascular: Negative. Negative for chest pain, palpitations and leg swelling. Gastrointestinal: Negative for abdominal pain, blood in stool, constipation, diarrhea, nausea and vomiting. Genitourinary: Negative for difficulty urinating. Neurological: Negative for weakness, light-headedness and headaches. Psychiatric/Behavioral: Negative. Negative for dysphoric mood.        Past Medical History:   Diagnosis Date    Acute appendicitis 2014    Appendicitis     Depression     Marfan syndrome     Stroke [facility])      Past Surgical History:   Procedure Laterality Date    APPENDECTOMY      HERNIA REPAIR       Social History     Socioeconomic History  Marital status: Single     Spouse name: Not on file    Number of children: Not on file    Years of education: Not on file    Highest education level: Not on file   Occupational History    Not on file   Tobacco Use    Smoking status: Current Every Day Smoker     Types: E-Cigarettes    Smokeless tobacco: Former User     Quit date: 2019   Vaping Use    Vaping Use: Every day    Substances: Nicotine    Devices: Pre-filled pod   Substance and Sexual Activity    Alcohol use: Yes     Alcohol/week: 24.0 standard drinks     Types: 24 Cans of beer per week    Drug use: Yes     Frequency: 7.0 times per week     Types: Marijuana Veldon Bunting)    Sexual activity: Not Currently   Other Topics Concern    Not on file   Social History Narrative    Not on file     Social Determinants of Health     Financial Resource Strain: Medium Risk    Difficulty of Paying Living Expenses: Somewhat hard   Food Insecurity: Food Insecurity Present    Worried About Running Out of Food in the Last Year: Often true    Alvaro of Food in the Last Year: Often true   Transportation Needs:     Lack of Transportation (Medical): Not on file    Lack of Transportation (Non-Medical):  Not on file   Physical Activity:     Days of Exercise per Week: Not on file    Minutes of Exercise per Session: Not on file   Stress:     Feeling of Stress : Not on file   Social Connections:     Frequency of Communication with Friends and Family: Not on file    Frequency of Social Gatherings with Friends and Family: Not on file    Attends Roman Catholic Services: Not on file    Active Member of Clubs or Organizations: Not on file    Attends Club or Organization Meetings: Not on file    Marital Status: Not on file   Intimate Partner Violence:     Fear of Current or Ex-Partner: Not on file    Emotionally Abused: Not on file    Physically Abused: Not on file    Sexually Abused: Not on file   Housing Stability:     Unable to Pay for Housing in the Last Year:

## 2022-01-24 NOTE — LETTER
[facility]  [address]  [place] [address]  Phone: [phone]  Fax: [phone], APRN - CNP        January 24, 2022     Patient: Dinorah Rico   YOB: 2000   Date of Visit: 1/24/2022       To Whom It May Concern: It is my medical opinion that Dinorah Rico may return to work on 1/25/22. He was evaluated today in office. If you have any questions or concerns, please don't hesitate to call.     Sincerely,        KARLA Myers CNP

## 2022-03-21 ENCOUNTER — OFFICE VISIT (OUTPATIENT)
Dept: NEUROLOGY | Age: 22
End: 2022-03-21
Payer: COMMERCIAL

## 2022-03-21 VITALS
WEIGHT: 172.2 LBS | DIASTOLIC BLOOD PRESSURE: 66 MMHG | HEART RATE: 90 BPM | SYSTOLIC BLOOD PRESSURE: 106 MMHG | BODY MASS INDEX: 21.81 KG/M2

## 2022-03-21 DIAGNOSIS — I63.512 LEFT MIDDLE CEREBRAL ARTERY STROKE (HCC): ICD-10-CM

## 2022-03-21 DIAGNOSIS — Q87.40 MARFAN'S SYNDROME: ICD-10-CM

## 2022-03-21 DIAGNOSIS — G40.409 OTHER GENERALIZED EPILEPSY, NOT INTRACTABLE, WITHOUT STATUS EPILEPTICUS (HCC): ICD-10-CM

## 2022-03-21 DIAGNOSIS — I63.9 CRYPTOGENIC STROKE (HCC): Primary | ICD-10-CM

## 2022-03-21 PROCEDURE — 99214 OFFICE O/P EST MOD 30 MIN: CPT | Performed by: PSYCHIATRY & NEUROLOGY

## 2022-03-21 RX ORDER — ATORVASTATIN CALCIUM 10 MG/1
10 TABLET, FILM COATED ORAL NIGHTLY
Qty: 30 TABLET | Refills: 2 | Status: SHIPPED | OUTPATIENT
Start: 2022-03-21 | End: 2022-06-20

## 2022-03-21 RX ORDER — LEVETIRACETAM 500 MG/1
500 TABLET ORAL 2 TIMES DAILY
Qty: 60 TABLET | Refills: 2 | Status: SHIPPED | OUTPATIENT
Start: 2022-03-21 | End: 2022-07-20 | Stop reason: SDUPTHER

## 2022-03-21 ASSESSMENT — ENCOUNTER SYMPTOMS
NAUSEA: 0
TROUBLE SWALLOWING: 0
COLOR CHANGE: 0
SHORTNESS OF BREATH: 0
BACK PAIN: 0
PHOTOPHOBIA: 0
CHOKING: 0
VOMITING: 0

## 2022-03-21 NOTE — PROGRESS NOTES
Subjective:      Patient ID: Cathi Fierro is a 23 y.o. male who presents today for:  Chief Complaint   Patient presents with    Follow-up     Pt states that he is doing fine. He states that he is working a lot right. He states that he has been having migrianes here there. He states that he thinks its from increased work and not sleeping well. HPI 23 right-handed gentleman now with a known history of cryptogenic stroke with a left middle cerebral artery stroke with epilepsy. Patient continues on Keppra 5 mg twice a day. Patient likely had a stroke as he has marfanoid features. The higher incidence of strokes in this population patient continues on aspirin and atorvastatin. He is doing quite well without recurrence of symptoms. Patient has not any bleeding or bruising or choking. He actually has not any issues with his marfanoid syndrome either. Past Medical History:   Diagnosis Date    Acute appendicitis 9/8/2014    Appendicitis     Depression     Marfan syndrome     Stroke [facility])      Past Surgical History:   Procedure Laterality Date    APPENDECTOMY      HERNIA REPAIR       Social History     Socioeconomic History    Marital status: Single     Spouse name: Not on file    Number of children: Not on file    Years of education: Not on file    Highest education level: Not on file   Occupational History    Not on file   Tobacco Use    Smoking status: Current Every Day Smoker     Types: E-Cigarettes    Smokeless tobacco: Former User     Quit date: 2019   Vaping Use    Vaping Use: Every day    Substances: Nicotine    Devices: Pre-filled pod   Substance and Sexual Activity    Alcohol use:  Yes     Alcohol/week: 24.0 standard drinks     Types: 24 Cans of beer per week    Drug use: Yes     Frequency: 7.0 times per week     Types: Marijuana Eudelia Svitlana)    Sexual activity: Not Currently   Other Topics Concern    Not on file   Social History Narrative    Not on file     Social Determinants of Health     Financial Resource Strain: Medium Risk    Difficulty of Paying Living Expenses: Somewhat hard   Food Insecurity: Food Insecurity Present    Worried About Running Out of Food in the Last Year: Often true    Alvaro of Food in the Last Year: Often true   Transportation Needs:     Lack of Transportation (Medical): Not on file    Lack of Transportation (Non-Medical): Not on file   Physical Activity:     Days of Exercise per Week: Not on file    Minutes of Exercise per Session: Not on file   Stress:     Feeling of Stress : Not on file   Social Connections:     Frequency of Communication with Friends and Family: Not on file    Frequency of Social Gatherings with Friends and Family: Not on file    Attends Sikhism Services: Not on file    Active Member of [address] [place] [address] Cardio control or Organizations: Not on file    Attends Club or Organization Meetings: Not on file    Marital Status: Not on file   Intimate Partner Violence:     Fear of Current or Ex-Partner: Not on file    Emotionally Abused: Not on file    Physically Abused: Not on file    Sexually Abused: Not on file   Housing Stability:     Unable to Pay for Housing in the Last Year: Not on file    Number of [place] in the Last Year: Not on file    Unstable Housing in the Last Year: Not on file     Family History   Problem Relation Age of Onset    Diabetes Maternal Grandmother     Rheum Arthritis Maternal Grandmother     Diabetes Maternal Grandfather     Hypertension Maternal Grandfather      No Known Allergies    Current Outpatient Medications   Medication Sig Dispense Refill    atorvastatin (LIPITOR) 10 MG tablet TAKE 1 TABLET BY MOUTH ONCE DAILY NIGHTLY .  APPOINTMENT REQUIRED FOR FUTURE REFILLS 30 tablet 2    venlafaxine 225 MG extended release tablet TAKE 1 TABLET BY MOUTH ONCE DAILY 30 tablet 3    levETIRAcetam (KEPPRA) 500 MG tablet Take 1 tablet by mouth twice daily 60 tablet 3    fluticasone (FLONASE) 50 MCG/ACT nasal spray 2 sprays by Each Nostril route daily 16 g 1    aspirin 325 MG EC tablet Take 1 tablet by mouth daily 30 tablet 3    ibuprofen (ADVIL;MOTRIN) 600 MG tablet Take 1 tablet by mouth every 8 hours as needed for Pain or Fever (Patient not taking: Reported on 3/21/2022) 30 tablet 0     No current facility-administered medications for this visit. Review of Systems   Constitutional: Negative for fever. HENT: Negative for ear pain, tinnitus and trouble swallowing. Eyes: Negative for photophobia and visual disturbance. Respiratory: Negative for choking and shortness of breath. Cardiovascular: Negative for chest pain and palpitations. Gastrointestinal: Negative for nausea and vomiting. Musculoskeletal: Negative for back pain, gait problem, joint swelling, myalgias, neck pain and neck stiffness. Skin: Negative for color change. Allergic/Immunologic: Negative for food allergies. Neurological: Negative for dizziness, tremors, seizures, syncope, facial asymmetry, speech difficulty, weakness, light-headedness, numbness and headaches. Psychiatric/Behavioral: Negative for behavioral problems, confusion, hallucinations and sleep disturbance. Objective:   /66 (Site: Left Upper Arm, Position: Sitting, Cuff Size: Medium Adult)   Pulse 90   Wt 172 lb 3.2 oz (78.1 kg)   BMI 21.81 kg/m²     Physical Exam  Vitals reviewed. Eyes:      Pupils: Pupils are equal, round, and reactive to light. Cardiovascular:      Rate and Rhythm: Normal rate and regular rhythm. Heart sounds: No murmur heard. Pulmonary:      Effort: Pulmonary effort is normal.      Breath sounds: Normal breath sounds. Abdominal:      General: Bowel sounds are normal.   Musculoskeletal:         General: Normal range of motion. Cervical back: Normal range of motion. Skin:     General: Skin is warm. Neurological:      Mental Status: He is alert and oriented to person, place, and time.       Cranial Nerves: No cranial nerve deficit. Sensory: No sensory deficit. Motor: No abnormal muscle tone. Coordination: Coordination normal.      Deep Tendon Reflexes: Reflexes are normal and symmetric. Babinski sign absent on the right side. Babinski sign absent on the left side. Psychiatric:         Mood and Affect: Mood normal.       Patient does have marfanoid features. No results found. Lab Results   Component Value Date    WBC 6.3 01/03/2022    RBC 4.90 01/03/2022    HGB 14.7 01/03/2022    HCT 44.0 01/03/2022    MCV 89.8 01/03/2022    MCH 30.0 01/03/2022    MCHC 33.4 01/03/2022    RDW 12.2 01/03/2022     01/03/2022    MPV 92 09/03/2020     Lab Results   Component Value Date     01/03/2022    K 3.9 01/03/2022    K 3.8 12/07/2018     01/03/2022    CO2 26 01/03/2022    BUN 4 01/03/2022    CREATININE 0.82 01/03/2022    GFRAA >60.0 01/03/2022    LABGLOM >60.0 01/03/2022    GLUCOSE 124 01/03/2022    PROT 7.9 01/03/2022    LABALBU 5.0 01/03/2022    CALCIUM 9.3 01/03/2022    BILITOT 0.7 01/03/2022    ALKPHOS 51 01/03/2022    AST 32 01/03/2022    ALT 28 01/03/2022     Lab Results   Component Value Date    PROTIME 12.0 12/05/2018    INR 1.2 12/05/2018     Lab Results   Component Value Date    TSH 1.940 12/04/2018     Lab Results   Component Value Date    TRIG 89 12/07/2018    HDL 28 12/07/2018    LDLCALC 66 12/07/2018     Lab Results   Component Value Date    LABAMPH Neg 01/03/2022    BARBSCNU Neg 01/03/2022    LABBENZ Neg 01/03/2022    OPIATESCREENURINE Neg 01/03/2022    PHENCYCLIDINESCREENURINE Neg 01/03/2022    ETOH <10 12/04/2018     No results found for: LITHIUM, DILFRTOT, VALPROATE    Assessment:       Diagnosis Orders   1. Cryptogenic stroke ([facility]     2. Marfan's syndrome     3. Other generalized epilepsy, not intractable, without status epilepticus ([facility] [address]     4. Left middle cerebral artery stroke (HCC)     Cryptogenic stroke in the left middle cerebral artery distribution.   All his investigations are normal though he does have marfanoid features. There is a high incidence of strokes in this population. We have continued him on atorvastatin as well as aspirin. Is not any recurrent symptoms of stroke or cerebral TIAs or any side effects of the same. He also had a seizure secondary to the stroke and is on Keppra 500 mg twice a day which we will continue. He is functioning otherwise quite well is not any symptoms to suggest partial seizures we will keep him on the same medication and not to rock the boat. We actually prescribe all of his medications. We will see him in a year and earlier if any concerns      Plan:      No orders of the defined types were placed in this encounter. No orders of the defined types were placed in this encounter. No follow-ups on file.       Lupe Nuñez MD

## 2022-04-05 NOTE — TELEPHONE ENCOUNTER
Comments:    Last Office Visit (last PCP visit):   1/24/2022    Next Visit Date:  Future Appointments   Date Time Provider Mikhail Manju   5/11/2022 10:00 AM Dustin Ordoñez, [address] [place] [address]   3/20/2023  1:00 PM Richard Hsu MD [facility]       **If hasn't been seen in over a year OR hasn't followed up according to last diabetes/ADHD visit, make appointment for patient before sending refill to provider.     Rx requested:  Requested Prescriptions     Pending Prescriptions Disp Refills    venlafaxine 225 MG extended release tablet [Pharmacy Med Name: Venlafaxine HCl  MG Oral Tablet Extended Release 24 Hour] 22 tablet 0     Sig: Take 1 tablet by mouth once daily

## 2022-04-06 RX ORDER — VENLAFAXINE HYDROCHLORIDE 225 MG/1
TABLET, EXTENDED RELEASE ORAL
Qty: 90 TABLET | Refills: 1 | Status: SHIPPED | OUTPATIENT
Start: 2022-04-06 | End: 2022-09-30

## 2022-04-07 NOTE — TELEPHONE ENCOUNTER
Spoke with insurance and Pt. Pt needs to contact [employer] and update his insurance, for it is new. Once he does that the rx should go thru. If not, he will let us know.

## 2022-04-22 NOTE — ED PROVIDER NOTES
[address] ED  eMERGENCY dEPARTMENT eNCOUnter      Pt Name: Michelle Issa  MRN: [id number]  [place] 2000  Date of evaluation: 1/3/2022  Provider: Matt Zee MD    [address]       Chief Complaint   Patient presents with    Hip Pain     bilateral hip pain upon awakening    Back Pain     lower back pain when he awoke this morning    Withdrawal     feels like hes withdrawing from antidepressants       HISTORY OF PRESENT ILLNESS   (Location/Symptom, Timing/Onset,Context/Setting, Quality, Duration, Modifying Factors, Severity)  Note limiting factors. Michelle Issa is a 19 y.o. male who presents to the emergency department patient stated that he is waiting for a prescription to be filled woke up with back pain he think he is getting withdrawal history anxiety depression live by himself no fever no chills history of Marfan syndrome and diagnosed long time ago history of cryptogenic stroke no fever no chills slight nausea no UTI symptoms patient admits to be drinking alcohol few days ago denies any chest tightness chest pain or abdominal pain denies any numbness tingling her legs no bladder or bowel dysfunction has no injury no fall patient does marijuana on a chronic basis    HPI    NursingNotes were reviewed. REVIEW OF SYSTEMS    (2-9 systems for level 4, 10 or more for level 5)     Review of Systems   Constitutional: Positive for activity change and appetite change. Negative for fever. HENT: Negative for congestion, drooling, facial swelling, mouth sores, nosebleeds, sinus pressure, sore throat, trouble swallowing and voice change. Eyes: Negative for pain, discharge, redness and visual disturbance. Respiratory: Negative for cough, choking, chest tightness, shortness of breath, wheezing and stridor. Cardiovascular: Negative for chest pain, palpitations and leg swelling. Gastrointestinal: Negative for abdominal pain, blood in stool, constipation, diarrhea and vomiting.    Endocrine: Negative for cold intolerance, polyphagia and polyuria. Genitourinary: Negative for dysuria, flank pain, frequency, genital sores and urgency. Musculoskeletal: Positive for arthralgias and back pain. Negative for joint swelling, neck pain and neck stiffness. Skin: Negative for pallor and rash. Neurological: Positive for weakness. Negative for tremors, seizures, syncope, numbness and headaches. Hematological: Negative for adenopathy. Does not bruise/bleed easily. Psychiatric/Behavioral: Negative for agitation, behavioral problems, hallucinations and sleep disturbance. The patient is nervous/anxious. The patient is not hyperactive. All other systems reviewed and are negative. Except as noted above the remainder of the review of systems was reviewed and negative. PAST MEDICAL HISTORY     Past Medical History:   Diagnosis Date    Acute appendicitis 9/8/2014    Appendicitis     Depression     Marfan syndrome     Stroke [facility])          SURGICALHISTORY       Past Surgical History:   Procedure Laterality Date    APPENDECTOMY      HERNIA REPAIR           CURRENT MEDICATIONS       Discharge Medication List as of 1/3/2022  8:48 AM      CONTINUE these medications which have NOT CHANGED    Details   atorvastatin (LIPITOR) 10 MG tablet TAKE 1 TABLET BY MOUTH ONCE DAILY NIGHTLY . APPOINTMENT REQUIRED FOR FUTURE REFILLS, Disp-30 tablet, R-2Normal      levETIRAcetam (KEPPRA) 500 MG tablet Take 1 tablet by mouth twice daily, Disp-60 tablet, R-3Normal      fluticasone (FLONASE) 50 MCG/ACT nasal spray 2 sprays by Each Nostril route daily, Disp-16 g, R-1Normal      aspirin 325 MG EC tablet Take 1 tablet by mouth daily, Disp-30 tablet, R-3Normal      venlafaxine 225 MG extended release tablet TAKE 1 TABLET BY MOUTH ONCE DAILY, Disp-30 tablet, R-3Normal             ALLERGIES     Patient has no known allergies.     FAMILY HISTORY       Family History   Problem Relation Age of Onset    Diabetes Maternal Grandmother     Rheum Arthritis Maternal Grandmother     Diabetes Maternal Grandfather     Hypertension Maternal Grandfather           SOCIAL HISTORY       Social History     Socioeconomic History    Marital status: Single     Spouse name: None    Number of children: None    Years of education: None    Highest education level: None   Occupational History    None   Tobacco Use    Smoking status: Current Every Day Smoker     Types: E-Cigarettes    Smokeless tobacco: Former User     Quit date: 2019   Vaping Use    Vaping Use: Every day    Substances: Nicotine    Devices: Pre-filled pod   Substance and Sexual Activity    Alcohol use: Yes     Alcohol/week: 24.0 standard drinks     Types: 24 Cans of beer per week    Drug use: Yes     Frequency: 7.0 times per week     Types: Marijuana Thurl Cipro)    Sexual activity: Not Currently   Other Topics Concern    None   Social History Narrative    None     Social Determinants of Health     Financial Resource Strain: Medium Risk    Difficulty of Paying Living Expenses: Somewhat hard   Food Insecurity: Food Insecurity Present    Worried About Running Out of Food in the Last Year: Often true    Alvaro of Food in the Last Year: Often true   Transportation Needs:     Lack of Transportation (Medical): Not on file    Lack of Transportation (Non-Medical):  Not on file   Physical Activity:     Days of Exercise per Week: Not on file    Minutes of Exercise per Session: Not on file   Stress:     Feeling of Stress : Not on file   Social Connections:     Frequency of Communication with Friends and Family: Not on file    Frequency of Social Gatherings with Friends and Family: Not on file    Attends [employer] Services: Not on file    Active Member of Clubs or Organizations: Not on file    Attends Club or Organization Meetings: Not on file    Marital Status: Not on file   Intimate Partner Violence:     Fear of Current or Ex-Partner: Not on file    Emotionally Abused: Not on file    Physically Abused: Not on file    Sexually Abused: Not on file   Housing Stability:     Unable to Pay for Housing in the Last Year: Not on file    Number of Places Lived in the Last Year: Not on file    Unstable Housing in the Last Year: Not on file       SCREENINGS    [place] Coma Scale  Eye Opening: Spontaneous  Best Verbal Response: Oriented  Best Motor Response: Obeys commands  [place] Coma Scale Score: 15 @FLOW([id number])@      PHYSICAL EXAM    (up to 7 for level 4, 8 or more for level 5)     ED Triage Vitals [01/03/22 0724]   BP Temp Temp Source Pulse Resp SpO2 Height Weight   130/77 99.4 °F (37.4 °C) Oral 122 16 97 % 6' 2\" (1.88 m) 160 lb (72.6 kg)       Physical Exam  Vitals and nursing note reviewed. Constitutional:       General: He is not in acute distress. Appearance: Normal appearance. He is not toxic-appearing or diaphoretic. Comments: Alert cooperative patient ambulatory slightly anxious   HENT:      Head: Normocephalic and atraumatic. Right Ear: Tympanic membrane, ear canal and external ear normal. There is no impacted cerumen. Left Ear: Tympanic membrane, ear canal and external ear normal. There is no impacted cerumen. Nose: No congestion or rhinorrhea. Mouth/Throat:      Mouth: Mucous membranes are moist.   Eyes:      General:         Right eye: No discharge. Left eye: No discharge. Neck:      Vascular: No carotid bruit. Cardiovascular:      Rate and Rhythm: Normal rate and regular rhythm. Heart sounds: No murmur heard. No friction rub. No gallop. Pulmonary:      Effort: No respiratory distress. Breath sounds: No stridor. No wheezing, rhonchi or rales. Chest:      Chest wall: No tenderness. Abdominal:      General: Abdomen is flat.       Comments: Attention given to the abdomen patient had no palpable thrill good bowel sounds no rebound tenderness no tenderness elicited on examination no CVA tenderness   Musculoskeletal: General: Tenderness present. No swelling. Cervical back: Normal range of motion and neck supple. No rigidity or tenderness. Right lower leg: No edema. Comments: Attention to the back examined in supine sitting position has paraspinal spasm tenderness on examination patient has straight leg raising test is negative no sensory deficit to lower extremity   Lymphadenopathy:      Cervical: No cervical adenopathy. Skin:     Capillary Refill: Capillary refill takes less than 2 seconds. Coloration: Skin is not jaundiced or pale. Findings: No bruising, lesion or rash. Neurological:      General: No focal deficit present. Mental Status: He is alert. Cranial Nerves: No cranial nerve deficit.       Coordination: Coordination normal.      Gait: Gait normal.      Deep Tendon Reflexes: Reflexes normal.   Psychiatric:      Comments: Alert cooperative patient slightly anxious ambulatory otherwise moving all extremities very well         DIAGNOSTIC RESULTS     EKG: All EKG's are interpreted by the Emergency Department Physician who either signs or Co-signsthis chart in the absence of a cardiologist.        RADIOLOGY:   Stephanie Asai such as CT, Ultrasound and MRI are read by the radiologist. Plain radiographic images are visualized and preliminarily interpreted by the emergency physician with the below findings:        Interpretation per the Radiologist below, if available at the time ofthis note:    No orders to display         ED BEDSIDE ULTRASOUND:   Performed by ED Physician - none    LABS:  Labs Reviewed   COMPREHENSIVE METABOLIC PANEL - Abnormal; Notable for the following components:       Result Value    Glucose 124 (*)     BUN 4 (*)     Albumin 5.0 (*)     All other components within normal limits   CBC WITH AUTO DIFFERENTIAL - Abnormal; Notable for the following components:    Neutrophils % 88.5 (*)     Eosinophils % 0.6 (*)     Neutrophils Absolute 5.5 (*)     Lymphocytes Absolute 0.2 (*)     All other components within normal limits   URINE DRUG SCREEN, COMPREHENSIVE - Abnormal; Notable for the following components:    Cannabinoid Scrn, Ur POSITIVE (*)     All other components within normal limits   URINE RT REFLEX TO CULTURE       All other labs were within normal range or not returned as of this dictation. EMERGENCY DEPARTMENT COURSE and DIFFERENTIAL DIAGNOSIS/MDM:   Vitals:    Vitals:    01/03/22 0724 01/03/22 0730 01/03/22 0900 01/03/22 0915   BP: 130/77 124/81 110/75 120/75   Pulse: 122 114 104 89   Resp: 16      Temp: 99.4 °F (37.4 °C)      TempSrc: Oral      SpO2: 97% 98% 95% 97%   Weight: 160 lb (72.6 kg)      Height: 6' 2\" (1.88 m)            MDM    CRITICAL CARE TIME   Total Critical Care time was  minutes, excluding separately reportableprocedures. There was a high probability of clinicallysignificant/life threatening deterioration in the patient's condition which required my urgent intervention. CONSULTS:  None    PROCEDURES:  Unless otherwise noted below, none     Procedures    FINAL IMPRESSION      1. Strain of lumbar region, initial encounter    2. Febrile illness, acute    3.  Viral illness          DISPOSITION/PLAN   DISPOSITION        PATIENT REFERRED TO:  Omar Leal, APRN - CNP  [address] [address]  [phone]    In 2 days  If symptoms worsen      DISCHARGE MEDICATIONS:  Discharge Medication List as of 1/3/2022  8:48 AM      START taking these medications    Details   ibuprofen (ADVIL;MOTRIN) 600 MG tablet Take 1 tablet by mouth every 8 hours as needed for Pain or Fever, Disp-30 tablet, R-0Print                (Please note that portions of this note were completed with a voice recognition program.  Efforts were made to edit the dictations but occasionally words are mis-transcribed.)    Dolly Comer MD (electronically signed)  Attending Emergency Physician        Dolly Comer MD  01/03/22 1016 Show Aperture Variable?: Yes Consent: The patient's consent was obtained including but not limited to risks of crusting, scabbing, blistering, scarring, darker or lighter pigmentary change, recurrence, incomplete removal and infection. Detail Level: Detailed Duration Of Freeze Thaw-Cycle (Seconds): 0 Post-Care Instructions: I reviewed with the patient in detail post-care instructions. Patient is to wear sunprotection, and avoid picking at any of the treated lesions. Pt may apply Vaseline to crusted or scabbing areas. Render Post-Care Instructions In Note?: no

## 2022-06-20 RX ORDER — ATORVASTATIN CALCIUM 10 MG/1
10 TABLET, FILM COATED ORAL NIGHTLY
Qty: 90 TABLET | Refills: 0 | Status: SHIPPED | OUTPATIENT
Start: 2022-06-20 | End: 2022-08-22

## 2022-07-15 ENCOUNTER — TELEPHONE (OUTPATIENT)
Dept: INTERNAL MEDICINE | Age: 22
End: 2022-07-15

## 2022-07-15 NOTE — TELEPHONE ENCOUNTER
Pt called in stating that he is experiencing some difficulties with his prescription. He states that there is a late delay in the medication when he takes it, at the end of the day he gets a burst of energy out of nowhere then starts to experience numbness in his hands, as well as his feet. Small brain spasms, battery tasting in his mouth. He is hoping tht he can be switched to something else. He did mention that he spoke to [facility] pharmacy and the informed him that there is a new  for the medication venlafaxine 225MG tablet.

## 2022-07-20 ENCOUNTER — OFFICE VISIT (OUTPATIENT)
Dept: INTERNAL MEDICINE | Age: 22
End: 2022-07-20
Payer: COMMERCIAL

## 2022-07-20 VITALS
WEIGHT: 170.6 LBS | TEMPERATURE: 98.2 F | SYSTOLIC BLOOD PRESSURE: 108 MMHG | HEIGHT: 74 IN | HEART RATE: 81 BPM | OXYGEN SATURATION: 99 % | RESPIRATION RATE: 16 BRPM | BODY MASS INDEX: 21.89 KG/M2 | DIASTOLIC BLOOD PRESSURE: 70 MMHG

## 2022-07-20 DIAGNOSIS — R53.82 CHRONIC FATIGUE: ICD-10-CM

## 2022-07-20 DIAGNOSIS — R53.82 CHRONIC FATIGUE: Primary | ICD-10-CM

## 2022-07-20 DIAGNOSIS — D72.829 LEUKOCYTOSIS, UNSPECIFIED TYPE: ICD-10-CM

## 2022-07-20 DIAGNOSIS — Z86.73 HISTORY OF ISCHEMIC MIDDLE CEREBRAL ARTERY STROKE: ICD-10-CM

## 2022-07-20 DIAGNOSIS — G40.409 OTHER GENERALIZED EPILEPSY, NOT INTRACTABLE, WITHOUT STATUS EPILEPTICUS (HCC): ICD-10-CM

## 2022-07-20 DIAGNOSIS — F33.1 MDD (MAJOR DEPRESSIVE DISORDER), RECURRENT EPISODE, MODERATE (HCC): ICD-10-CM

## 2022-07-20 LAB
BASOPHILS ABSOLUTE: 0 K/UL (ref 0–0.2)
BASOPHILS RELATIVE PERCENT: 0.8 %
EOSINOPHILS ABSOLUTE: 0 K/UL (ref 0–0.7)
EOSINOPHILS RELATIVE PERCENT: 0.7 %
HCT VFR BLD CALC: 45.1 % (ref 42–52)
HEMOGLOBIN: 15 G/DL (ref 14–18)
LYMPHOCYTES ABSOLUTE: 1.6 K/UL (ref 1–4.8)
LYMPHOCYTES RELATIVE PERCENT: 31.3 %
MCH RBC QN AUTO: 30.5 PG (ref 27–31.3)
MCHC RBC AUTO-ENTMCNC: 33.3 % (ref 33–37)
MCV RBC AUTO: 91.6 FL (ref 80–100)
MONOCYTES ABSOLUTE: 0.4 K/UL (ref 0.2–0.8)
MONOCYTES RELATIVE PERCENT: 8 %
NEUTROPHILS ABSOLUTE: 3 K/UL (ref 1.4–6.5)
NEUTROPHILS RELATIVE PERCENT: 59.2 %
PDW BLD-RTO: 12.9 % (ref 11.5–14.5)
PLATELET # BLD: 231 K/UL (ref 130–400)
RBC # BLD: 4.93 M/UL (ref 4.7–6.1)
TSH REFLEX: 1.43 UIU/ML (ref 0.44–3.86)
WBC # BLD: 5.1 K/UL (ref 4.8–10.8)

## 2022-07-20 PROCEDURE — 99214 OFFICE O/P EST MOD 30 MIN: CPT | Performed by: NURSE PRACTITIONER

## 2022-07-20 RX ORDER — VENLAFAXINE HYDROCHLORIDE 75 MG/1
CAPSULE, EXTENDED RELEASE ORAL
Qty: 30 CAPSULE | Refills: 0 | Status: SHIPPED | OUTPATIENT
Start: 2022-07-20 | End: 2022-08-24

## 2022-07-20 RX ORDER — VENLAFAXINE HYDROCHLORIDE 37.5 MG/1
CAPSULE, EXTENDED RELEASE ORAL
Qty: 14 CAPSULE | Refills: 0 | Status: SHIPPED | OUTPATIENT
Start: 2022-07-20 | End: 2022-08-24

## 2022-07-20 RX ORDER — DULOXETIN HYDROCHLORIDE 30 MG/1
CAPSULE, DELAYED RELEASE ORAL
Qty: 30 CAPSULE | Refills: 3 | Status: SHIPPED | OUTPATIENT
Start: 2022-07-20 | End: 2022-08-24

## 2022-07-20 RX ORDER — LEVETIRACETAM 500 MG/1
500 TABLET ORAL 2 TIMES DAILY
Qty: 60 TABLET | Refills: 2 | Status: SHIPPED | OUTPATIENT
Start: 2022-07-20

## 2022-07-20 ASSESSMENT — ENCOUNTER SYMPTOMS
WHEEZING: 0
CHEST TIGHTNESS: 0
BLOOD IN STOOL: 0
DIARRHEA: 0
COLOR CHANGE: 0
CONSTIPATION: 0
SHORTNESS OF BREATH: 0
RESPIRATORY NEGATIVE: 1

## 2022-07-20 NOTE — PROGRESS NOTES
[address] [facility] [address] [facility] PRIMARY CARE  [address] [facility] [address]  Dept: [phone]  Dept Fax: [phone]: [phone]     SUBJECTIVE    Jocelynn Booker (: 2000) is a 23 y.o. male, Established patient, here for evaluation of the following chief complaint(s):  Follow-up (Is on Venlafaxine for depression. The pharmacy made him aware that there was a  change and sine then he has had side effects. The medication does not seem to be working at [facility] acid taste in his mouth,numbness in hands and feet and \"brain spasms. \") and Fatigue (Fatigue since late March. Was sleeping 16 hours a day. Now started new job is sleeping eight hours but on his days off he naps a lot. No motivation.)      PCP:  KARLA Garcia CNP      Pt here today to change his anxiety/depression med venlafaxine d/t unwanted side effects with  change as listed above. Is working at [employer] in [place], new job now. Working with his best friend, more money. Got out of his position at [employer], happy about this. Having a hard  time getting out of bed. Is sleeping \"a lot lot\". He feels so tired and worn out for no reason. Feels lonely but not depressed. Still has goals he is chasing. Started the new job 3 weeks ago which he feels will be much better for him. Has had less motivation to [address] with his online friends, just doesn't feel up to it lately. Is going to work. Hasn't seen neuro for a little while, Dr. Lily Beatty for his epilepsy. Is on his last refill of keppra. Thinks has a f/u appt soon with him. Requesting refill til gets in there. Has had no seizures. Review of Systems   Constitutional:  Positive for fatigue. Negative for unexpected weight change. HENT:  Negative for congestion. Respiratory: Negative. Negative for chest tightness, shortness of breath and wheezing. Cardiovascular: Negative. Negative for chest pain, palpitations and leg swelling. Gastrointestinal:  Negative for blood in stool, constipation and diarrhea. Skin:  Negative for color change and pallor. Neurological:  Positive for numbness. Negative for seizures, weakness and light-headedness. Psychiatric/Behavioral:  Positive for dysphoric mood and sleep disturbance. Negative for self-injury and suicidal ideas. The patient is not nervous/anxious. Past Medical History:   Diagnosis Date    Acute appendicitis 9/8/2014    Appendicitis     Depression     Marfan syndrome     Stroke [facility])      Past Surgical History:   Procedure Laterality Date    APPENDECTOMY      HERNIA REPAIR       Social History     Socioeconomic History    Marital status: Single     Spouse name: Not on file    Number of children: Not on file    Years of education: Not on file    Highest education level: Not on file   Occupational History    Not on file   Tobacco Use    Smoking status: Every Day     Types: E-Cigarettes    Smokeless tobacco: Former     Quit date: 2019   Vaping Use    Vaping Use: Every day    Substances: Nicotine    Devices: Pre-filled pod   Substance and Sexual Activity    Alcohol use:  Yes     Alcohol/week: 24.0 standard drinks     Types: 24 Cans of beer per week    Drug use: Yes     Frequency: 7.0 times per week     Types: Marijuana Paul Nick)    Sexual activity: Not Currently   Other Topics Concern    Not on file   Social History Narrative    Not on file     Social Determinants of Health     Financial Resource Strain: Medium Risk    Difficulty of Paying Living Expenses: Somewhat hard   Food Insecurity: Food Insecurity Present    Worried About Running Out of Food in the Last Year: Often true    Ran Out of Food in the Last Year: Often true   Transportation Needs: Not on file   Physical Activity: Not on file   Stress: Not on file   Social Connections: Not on file   Intimate Partner Violence: Not on file   Housing Stability: Not on file     Family History Problem Relation Age of Onset    Diabetes Maternal Grandmother     Rheum Arthritis Maternal Grandmother     Diabetes Maternal Grandfather     Hypertension Maternal Grandfather       No Known Allergies  Prior to Admission medications    Medication Sig Start Date End Date Taking? Authorizing Provider   DULoxetine (CYMBALTA) 30 MG extended release capsule Take 1 tab by mouth daily for 14 days, then increase to 1 tab by mouth twice a day 7/20/22  Yes [place] Estela APRGERRY - CNP   venlafaxine (EFFEXOR XR) 75 MG extended release capsule Take 2 tabs by mouth daily for 1 week, then decrease to 1 tab by mouth daily for 1 week, then reduce to 37.5mg daily for week, then stop. Weaning to move to cymbalta 7/20/22  Yes [place] Estela APRN - CNP   venlafaxine (EFFEXOR XR) 37.5 MG extended release capsule Weaning down to 37.5mg, will take daily for 1 week then try to stop 7/20/22  Yes [place] Estela APRN - GENI   levETIRAcetam (KEPPRA) 500 MG tablet Take 1 tablet by mouth in the morning and 1 tablet before bedtime. 7/20/22  Yes [place] Estela APRN - CNP   atorvastatin (LIPITOR) 10 MG tablet TAKE 1 TABLET BY MOUTH AT BEDTIME 6/20/22  Yes Maria A Reardon MD   venlafaxine 225 MG extended release tablet Take 1 tablet by mouth once daily 4/6/22  Yes [place] Estela APRN - GENI   fluticasone [facility]) 50 MCG/ACT nasal spray 2 sprays by Each Nostril route daily 11/10/21  Yes [place] Estela APRN - CNP   aspirin 325 MG EC tablet Take 1 tablet by mouth daily 12/8/18  Yes Manuel Black DO   ibuprofen (ADVIL;MOTRIN) 600 MG tablet Take 1 tablet by mouth every 8 hours as needed for Pain or Fever  Patient not taking: Reported on 7/20/2022 1/3/22   Jhoan Blunt MD                I have reviewed the patient's medical history in detail and updated the computerized patient record.       OBJECTIVE    Vitals:    07/20/22 1024   BP: 108/70   Site: Left Upper Arm   Position: Sitting   Cuff Size: Medium Adult   Pulse: 81   Resp: 16   Temp: 98.2 °F (36.8 °C)   SpO2: 99%   Weight: 170 lb 9.6 oz (77.4 kg)   Height: 6' 2\" (1.88 m)       Physical Exam  Vitals and nursing note reviewed. Constitutional:       General: He is not in acute distress. Appearance: Normal appearance. He is well-developed. HENT:      Head: Normocephalic and atraumatic. Neck:      Thyroid: No thyromegaly. Cardiovascular:      Rate and Rhythm: Normal rate and regular rhythm. Heart sounds: Normal heart sounds. No murmur heard. Pulmonary:      Effort: Pulmonary effort is normal. No respiratory distress. Breath sounds: Normal breath sounds. No wheezing. Musculoskeletal:         General: Normal range of motion. Cervical back: Normal range of motion and neck supple. No tenderness. Lymphadenopathy:      Cervical: No cervical adenopathy. Skin:     General: Skin is warm and dry. Neurological:      Mental Status: He is alert and oriented to person, place, and time. Psychiatric:         Mood and Affect: Mood normal.         Behavior: Behavior normal.         ASSESSMENT/ PLAN    1. Chronic fatigue  -chronic, will check labs including recheck of cbc d/t some abnormalities in last panel  -could be from uncontrolled depression/anxiety, adjusting medications as below in #3  - CBC with Auto Differential; Future  - TSH with Reflex; Future  - Folate; Future  - Vitamin B12; Future  - Vitamin D 25 Hydroxy; Future    2. Leukocytosis, unspecified type  - CBC with Auto Differential; Future    3. MDD (major depressive disorder), recurrent episode, moderate (HCC)  Chronic, deteriorated  -will slowly wean off effexor d/t not as effective and having unwanted side effects with new  that provides this med. Will slowly start on cymbalta. Doing a wean d/t the high dose is currently on.   -he is to call right away with any issues/deterioration in mood or suicidal ideation given the meds adjusting  - DULoxetine (CYMBALTA) 30 MG extended release capsule;  Take 1 tab by mouth daily

## 2022-07-21 PROBLEM — Z86.73 HISTORY OF ISCHEMIC MIDDLE CEREBRAL ARTERY STROKE: Status: ACTIVE | Noted: 2022-07-21

## 2022-07-21 PROBLEM — I63.512 LEFT MIDDLE CEREBRAL ARTERY STROKE (HCC): Status: RESOLVED | Noted: 2021-09-20 | Resolved: 2022-07-21

## 2022-07-21 PROBLEM — D72.829 LEUKOCYTOSIS: Status: RESOLVED | Noted: 2018-12-05 | Resolved: 2022-07-21

## 2022-07-21 LAB
FOLATE: 18 NG/ML
VITAMIN B-12: 322 PG/ML (ref 232–1245)
VITAMIN D 25-HYDROXY: 25.8 NG/ML

## 2022-07-27 ENCOUNTER — TELEPHONE (OUTPATIENT)
Dept: INTERNAL MEDICINE | Age: 22
End: 2022-07-27

## 2022-07-27 NOTE — TELEPHONE ENCOUNTER
Pt did not review my chart message. Please inform:     Aniket Ayala, labs show only thing abnormal is his Vitamin D level was a little low. Taking an otc supplement of vit D will correct this. Your wbc panel all is back in normal range, so that is good. Am waiting on folate level to come back but based on the anemia panel looking good, I anticipate this will be normal aswell. If not I will reach out, otherwise assume is good.

## 2022-08-22 RX ORDER — ATORVASTATIN CALCIUM 10 MG/1
10 TABLET, FILM COATED ORAL NIGHTLY
Qty: 90 TABLET | Refills: 0 | Status: SHIPPED | OUTPATIENT
Start: 2022-08-22 | End: 2022-08-24 | Stop reason: SDUPTHER

## 2022-08-24 ENCOUNTER — OFFICE VISIT (OUTPATIENT)
Dept: INTERNAL MEDICINE | Age: 22
End: 2022-08-24
Payer: COMMERCIAL

## 2022-08-24 VITALS
BODY MASS INDEX: 22.08 KG/M2 | HEART RATE: 82 BPM | SYSTOLIC BLOOD PRESSURE: 118 MMHG | WEIGHT: 172 LBS | OXYGEN SATURATION: 98 % | DIASTOLIC BLOOD PRESSURE: 68 MMHG

## 2022-08-24 DIAGNOSIS — G40.409 OTHER GENERALIZED EPILEPSY, NOT INTRACTABLE, WITHOUT STATUS EPILEPTICUS (HCC): ICD-10-CM

## 2022-08-24 DIAGNOSIS — Z86.73 HISTORY OF ISCHEMIC MIDDLE CEREBRAL ARTERY STROKE: ICD-10-CM

## 2022-08-24 DIAGNOSIS — F33.1 MDD (MAJOR DEPRESSIVE DISORDER), RECURRENT EPISODE, MODERATE (HCC): Primary | ICD-10-CM

## 2022-08-24 PROCEDURE — 99213 OFFICE O/P EST LOW 20 MIN: CPT | Performed by: NURSE PRACTITIONER

## 2022-08-24 RX ORDER — DULOXETIN HYDROCHLORIDE 30 MG/1
30 CAPSULE, DELAYED RELEASE ORAL 2 TIMES DAILY
Qty: 30 CAPSULE | Refills: 0
Start: 2022-08-24

## 2022-08-24 RX ORDER — ATORVASTATIN CALCIUM 10 MG/1
10 TABLET, FILM COATED ORAL NIGHTLY
Qty: 90 TABLET | Refills: 3 | Status: SHIPPED | OUTPATIENT
Start: 2022-08-24

## 2022-08-24 ASSESSMENT — ENCOUNTER SYMPTOMS
RESPIRATORY NEGATIVE: 1
WHEEZING: 0
CHEST TIGHTNESS: 0
BLOOD IN STOOL: 0
SHORTNESS OF BREATH: 0

## 2022-08-24 NOTE — PROGRESS NOTES
[address]  PRIMARY CARE  [address]estine[address]  [address] [place] [address]  Dept: [phone]  Dept Fax: [phone]: [phone]     CAROL ANN Goodman (: 2000) is a 23 y.o. male, Established patient, here for evaluation of the following chief complaint(s):  1 Month Follow-Up (Mood, Cymbalta is helping a lot )      PCP:  KARLA Rosado - CNP      Pt here today for 1 month f/u on moods. Doing much better. Feels like a different person already since switching to cymbalta from effexor. No unwanted side effects. Is enjoying music again, attending concerts and festivals. Work is going well. Best has felt in a long time. Responding to friends texts. Review of Systems   Constitutional: Negative. Negative for fatigue and unexpected weight change. Respiratory: Negative. Negative for chest tightness, shortness of breath and wheezing. Cardiovascular: Negative. Negative for chest pain, palpitations and leg swelling. Gastrointestinal:  Negative for blood in stool. Neurological:  Negative for weakness and light-headedness. Psychiatric/Behavioral: Negative. Negative for dysphoric mood and sleep disturbance. The patient is not nervous/anxious.       Past Medical History:   Diagnosis Date    Acute appendicitis 2014    Appendicitis     Cryptogenic stroke [facility])     Depression     Left middle cerebral artery stroke ([facility] [address] 2021    Leukocytosis 2018    Marfan syndrome     Stroke [facility])      Past Surgical History:   Procedure Laterality Date    APPENDECTOMY      HERNIA REPAIR       Social History     Socioeconomic History    Marital status: Single     Spouse name: Not on file    Number of children: Not on file    Years of education: Not on file    Highest education level: Not on file   Occupational History    Not on file   Tobacco Use    Smoking status: Every Day     Types: E-Cigarettes    Smokeless tobacco: Former     Quit date: 2019   Vaping Use    Vaping Use: Every day    Substances: Nicotine    Devices: Pre-filled pod   Substance and Sexual Activity    Alcohol use: Yes     Alcohol/week: 24.0 standard drinks     Types: 24 Cans of beer per week    Drug use: Yes     Frequency: 7.0 times per week     Types: Marijuana [facility])    Sexual activity: Not Currently   Other Topics Concern    Not on file   Social History Narrative    Not on file     Social Determinants of Health     Financial Resource Strain: Medium Risk    Difficulty of Paying Living Expenses: Somewhat hard   Food Insecurity: Food Insecurity Present    Worried About Running Out of Food in the Last Year: Often true    Ran Out of Food in the Last Year: Often true   Transportation Needs: Not on file   Physical Activity: Not on file   Stress: Not on file   Social Connections: Not on file   Intimate Partner Violence: Not on file   Housing Stability: Not on file     Family History   Problem Relation Age of Onset    Diabetes Maternal Grandmother     Rheum Arthritis Maternal Grandmother     Diabetes Maternal Grandfather     Hypertension Maternal Grandfather       No Known Allergies  Prior to Admission medications    Medication Sig Start Date End Date Taking? Authorizing Provider   atorvastatin (LIPITOR) 10 MG tablet Take 1 tablet by mouth at bedtime 8/24/22  Yes KARLA Montero CNP   DULoxetine (CYMBALTA) 30 MG extended release capsule Take 1 capsule by mouth 2 times daily 8/24/22  Yes KARLA Montero CNP   levETIRAcetam (KEPPRA) 500 MG tablet Take 1 tablet by mouth in the morning and 1 tablet before bedtime.  7/20/22  Yes KARLA Montero CNP   venlafaxine 225 MG extended release tablet Take 1 tablet by mouth once daily 4/6/22  Yes KARLA Montero CNP   fluticasone [facility]) 50 MCG/ACT nasal spray 2 sprays by Each Nostril route daily 11/10/21  Yes KARLA Montero CNP   aspirin 325 MG EC tablet Take 1 tablet by mouth daily 12/8/18  Yes Eliezer Flores [address],                 I have reviewed the patient's medical history in detail and updated the computerized patient record. OBJECTIVE    Vitals:    08/24/22 0920   BP: 118/68   Site: Right Upper Arm   Position: Sitting   Cuff Size: Medium Adult   Pulse: 82   SpO2: 98%   Weight: 172 lb (78 kg)       Physical Exam  Vitals and nursing note reviewed. Constitutional:       General: He is not in acute distress. Appearance: He is well-developed. HENT:      Head: Normocephalic and atraumatic. Neck:      Thyroid: No thyromegaly. Cardiovascular:      Rate and Rhythm: Normal rate and regular rhythm. Heart sounds: Normal heart sounds. No murmur heard. Pulmonary:      Effort: Pulmonary effort is normal. No respiratory distress. Breath sounds: Normal breath sounds. No wheezing. Musculoskeletal:      Cervical back: Normal range of motion. Lymphadenopathy:      Cervical: No cervical adenopathy. Skin:     General: Skin is warm and dry. Neurological:      Mental Status: He is alert and oriented to person, place, and time. Psychiatric:         Mood and Affect: Mood normal.         Behavior: Behavior normal.         ASSESSMENT/ PLAN    1. MDD (major depressive disorder), recurrent episode, moderate (HCC)  -chronic, improved with switching from effexor to cymbalta. Continue. Will call for refill in 2 months  - DULoxetine (CYMBALTA) 30 MG extended release capsule; Take 1 capsule by mouth 2 times daily  Dispense: 30 capsule; Refill: 0    2. Other generalized epilepsy, not intractable, without status epilepticus ([facility] [address]  Chronic, stable, reviewed neuro last note. Plan to continue on keppra since no issues. Gave him refills in July to get thru Oct, he will call Dr. Jesus Goddard for next fill since only f/u yearly with him now, next in march    3. History of ischemic middle cerebral artery stroke  Chronic, stable from marfan's syndrome.  Statin and asa chronically, refilled  - atorvastatin (LIPITOR) 10 MG tablet; Take 1 tablet by mouth at bedtime  Dispense: 90 tablet; Refill: 3        Return in about 6 months (around 2/24/2023) for mood recheck.      Electronically signed by:  KARLA Jeter CNP   8/24/22

## 2022-09-30 ENCOUNTER — OFFICE VISIT (OUTPATIENT)
Dept: INTERNAL MEDICINE | Age: 22
End: 2022-09-30
Payer: COMMERCIAL

## 2022-09-30 VITALS
TEMPERATURE: 98.2 F | OXYGEN SATURATION: 98 % | HEART RATE: 105 BPM | DIASTOLIC BLOOD PRESSURE: 70 MMHG | WEIGHT: 175 LBS | SYSTOLIC BLOOD PRESSURE: 110 MMHG | BODY MASS INDEX: 22.47 KG/M2

## 2022-09-30 DIAGNOSIS — U07.1 COVID-19: Primary | ICD-10-CM

## 2022-09-30 DIAGNOSIS — Z20.822 ENCOUNTER FOR SCREENING LABORATORY TESTING FOR COVID-19 VIRUS: ICD-10-CM

## 2022-09-30 LAB
Lab: ABNORMAL
PERFORMING INSTRUMENT: ABNORMAL
QC PASS/FAIL: ABNORMAL
SARS-COV-2, POC: DETECTED

## 2022-09-30 PROCEDURE — 99213 OFFICE O/P EST LOW 20 MIN: CPT | Performed by: NURSE PRACTITIONER

## 2022-09-30 PROCEDURE — 87426 SARSCOV CORONAVIRUS AG IA: CPT | Performed by: NURSE PRACTITIONER

## 2022-09-30 ASSESSMENT — ENCOUNTER SYMPTOMS
VOMITING: 1
SINUS PAIN: 0
RHINORRHEA: 0
SHORTNESS OF BREATH: 0
DIARRHEA: 0
NAUSEA: 0
ABDOMINAL PAIN: 0
SINUS PRESSURE: 0
CHEST TIGHTNESS: 1
COUGH: 1
WHEEZING: 0
SORE THROAT: 1

## 2022-09-30 NOTE — PROGRESS NOTES
Wt 175 lb (79.4 kg)   SpO2 98%   BMI 22.47 kg/m²     Physical Exam  Vitals reviewed. Constitutional:       General: He is not in acute distress. Appearance: He is well-developed. He is not ill-appearing. HENT:      Head: Normocephalic. Mouth/Throat:      Lips: Pink. Mouth: Mucous membranes are moist.      Pharynx: Oropharynx is clear. Cardiovascular:      Rate and Rhythm: Normal rate and regular rhythm. Heart sounds: Normal heart sounds. Pulmonary:      Effort: Pulmonary effort is normal. No respiratory distress. Breath sounds: Normal breath sounds. Musculoskeletal:         General: Normal range of motion. Skin:     General: Skin is warm and dry. Neurological:      Mental Status: He is alert and oriented to person, place, and time. Assessment:       Diagnosis Orders   1. COVID-19        2. Encounter for screening laboratory testing for COVID-19 virus  POCT COVID-19, Antigen            Plan:      Orders Placed This Encounter   Procedures    POCT COVID-19, Antigen     Order Specific Question:   Is this test for diagnosis or screening? Answer:   Diagnosis of ill patient     Order Specific Question:   Symptomatic for COVID-19 as defined by CDC? Answer:   Yes     Order Specific Question:   Date of Symptom Onset     Answer:   9/27/2022     Order Specific Question:   Hospitalized for COVID-19? Answer:   No     Order Specific Question:   Admitted to ICU for COVID-19? Answer:   No     Order Specific Question:   Employed in healthcare setting? Answer:   No     Order Specific Question:   Resident in a congregate (group) care setting? Answer:   No     Order Specific Question:   Pregnant: Answer:   No     Order Specific Question:   Previously tested for COVID-19? Answer:   No     Covid positive. Reviewed usual course of illness/isolation and supportive measures for symptom management. Reviewed symptoms requiring ER.  Patient verbalizes understanding. I have reviewed and updated the electronic medical record. Return if symptoms worsen or fail to improve, for follow up with PCP.     KARLA Phillips - NP

## 2022-09-30 NOTE — LETTER
9/30/2022    Mr. Tutu Cooley  [address] [facility] [address]  [address] [id number]      To Whom It May Concern:    Tutu Cooley was tested for COVID-19 on 9/30, and the result was positive. If there are questions or concerns, please have the patient contact our office.         Sincerely,      KARLA Billings NP

## 2022-10-10 ENCOUNTER — OFFICE VISIT (OUTPATIENT)
Dept: INTERNAL MEDICINE | Age: 22
End: 2022-10-10
Payer: COMMERCIAL

## 2022-10-10 VITALS
BODY MASS INDEX: 22.16 KG/M2 | WEIGHT: 172.6 LBS | TEMPERATURE: 97.6 F | DIASTOLIC BLOOD PRESSURE: 70 MMHG | HEART RATE: 100 BPM | SYSTOLIC BLOOD PRESSURE: 110 MMHG | OXYGEN SATURATION: 96 %

## 2022-10-10 DIAGNOSIS — Z86.16 HISTORY OF COVID-19: Primary | ICD-10-CM

## 2022-10-10 PROCEDURE — 99213 OFFICE O/P EST LOW 20 MIN: CPT | Performed by: NURSE PRACTITIONER

## 2022-10-10 ASSESSMENT — ENCOUNTER SYMPTOMS
NAUSEA: 1
DIARRHEA: 0
SORE THROAT: 0
COUGH: 1
ABDOMINAL PAIN: 0
RHINORRHEA: 0
SINUS PRESSURE: 0
SINUS PAIN: 0
WHEEZING: 0
SHORTNESS OF BREATH: 0
VOMITING: 0

## 2022-10-10 NOTE — PROGRESS NOTES
Subjective:      Patient ID: Tod Busby is a 23 y.o. male who presents today for:  Chief Complaint   Patient presents with    Post-COVID Symptoms     Congestion, nausea, fatigue       Patient presents to the office for covid related symptoms. He tested positive on 9/30/22 and symptoms started approximately 4-5 days prior. Patient states he is feeling better overall, but not well enough to return to work. He is still experiencing fatigue, nausea, cough and congestion. Symptoms are not constant. Past Medical History:   Diagnosis Date    Acute appendicitis 9/8/2014    Appendicitis     Cryptogenic stroke [facility])     Depression     Left middle cerebral artery stroke ([facility] [address] 9/20/2021    Leukocytosis 12/5/2018    Marfan syndrome     Stroke [facility])      Past Surgical History:   Procedure Laterality Date    APPENDECTOMY      HERNIA REPAIR       Family History   Problem Relation Age of Onset    Diabetes Maternal Grandmother     Rheum Arthritis Maternal Grandmother     Diabetes Maternal Grandfather     Hypertension Maternal Grandfather      No Known Allergies      Review of Systems   Constitutional:  Positive for fatigue. Negative for chills and fever. HENT:  Positive for congestion. Negative for ear pain, postnasal drip, rhinorrhea, sinus pressure, sinus pain and sore throat. Respiratory:  Positive for cough. Negative for shortness of breath and wheezing. Cardiovascular:  Negative for chest pain. Gastrointestinal:  Positive for nausea. Negative for abdominal pain, diarrhea and vomiting. Musculoskeletal:  Negative for arthralgias and myalgias. Skin:  Negative for rash. Neurological:  Negative for headaches. Objective:   /70   Pulse 100   Temp 97.6 °F (36.4 °C) (Infrared)   Wt 172 lb 9.6 oz (78.3 kg)   SpO2 96%   BMI 22.16 kg/m²     Physical Exam  Vitals reviewed. Constitutional:       General: He is not in acute distress. Appearance: He is well-developed. He is not ill-appearing. HENT:      Head: Normocephalic. Nose: Nose normal.      Mouth/Throat:      Lips: Pink. Mouth: Mucous membranes are moist.      Pharynx: Oropharynx is clear. Cardiovascular:      Rate and Rhythm: Normal rate and regular rhythm. Heart sounds: Normal heart sounds. Pulmonary:      Effort: Pulmonary effort is normal. No respiratory distress. Breath sounds: Normal breath sounds. Abdominal:      General: Bowel sounds are normal. There is no distension. Palpations: Abdomen is soft. Tenderness: There is no abdominal tenderness. Musculoskeletal:         General: Normal range of motion. Skin:     General: Skin is warm and dry. Neurological:      Mental Status: He is alert and oriented to person, place, and time. Assessment:       Diagnosis Orders   1. History of COVID-19              Plan:      Reviewed supportive measures for symptom management. Will remain off of work today and tomorrow, returning on Wednesday. OTC symptom control as needed. Reviewed symptoms requiring prompt follow up. Patient verbalizes understanding. I have reviewed and updated the electronic medical record. Return if symptoms worsen or fail to improve, for follow up with PCP.     KARLA Weinberg NP

## 2022-10-10 NOTE — LETTER
NOTIFICATION RETURN TO WORK / SCHOOL    10/10/2022    Mr. Myrtle Ganser  [address] [facility] [address]  [address] [phone]      To Whom It May Concern:    Myrtle Ganser was tested for COVID-19 on 9/30, and the result was positive. He may return to work on 10/12. I recommend:return without restrictions    If there are questions or concerns, please have the patient contact our office.         Sincerely,      KARLA Lopez NP

## 2022-11-10 DIAGNOSIS — G40.409 OTHER GENERALIZED EPILEPSY, NOT INTRACTABLE, WITHOUT STATUS EPILEPTICUS (HCC): ICD-10-CM

## 2022-11-10 RX ORDER — LEVETIRACETAM 500 MG/1
TABLET ORAL
Qty: 60 TABLET | Refills: 2 | Status: SHIPPED | OUTPATIENT
Start: 2022-11-10

## 2022-11-10 NOTE — TELEPHONE ENCOUNTER
Comments:     Last Office Visit (last PCP visit):   8/24/2022    Next Visit Date:  Future Appointments   Date Time Provider Mikhail Nunes   3/1/2023  1:00 PM Fern8 SINTIA Dickens    3/20/2023  1:00 PM Tanja De Jesus MD [facility] -       **If hasn't been seen in over a year OR hasn't followed up according to last diabetes/ADHD visit, make appointment for patient before sending refill to provider.     Rx requested:  Requested Prescriptions     Pending Prescriptions Disp Refills    levETIRAcetam (KEPPRA) 500 MG tablet [Pharmacy Med Name: levETIRAcetam 500 MG Oral Tablet] 60 tablet 0     Sig: TAKE 1 TABLET BY MOUTH IN THE MORNING AND 1 BEFORE BEDTIME

## 2022-12-05 DIAGNOSIS — F33.1 MDD (MAJOR DEPRESSIVE DISORDER), RECURRENT EPISODE, MODERATE (HCC): ICD-10-CM

## 2022-12-05 RX ORDER — DULOXETIN HYDROCHLORIDE 30 MG/1
30 CAPSULE, DELAYED RELEASE ORAL 2 TIMES DAILY
Qty: 60 CAPSULE | Refills: 5 | Status: SHIPPED | OUTPATIENT
Start: 2022-12-05

## 2022-12-05 NOTE — TELEPHONE ENCOUNTER
Comments:     Last Office Visit (last PCP visit):   8/24/2022    Next Visit Date:  Future Appointments   Date Time Provider Mikhail Manju   3/1/2023  1:00 PM Fern8 SINTIA Dickens    3/20/2023  1:00 PM Leann Hernandez MD [facility] -       **If hasn't been seen in over a year OR hasn't followed up according to last diabetes/ADHD visit, make appointment for patient before sending refill to provider. Rx requested:  Requested Prescriptions     Pending Prescriptions Disp Refills    DULoxetine (CYMBALTA) 30 MG extended release capsule [Pharmacy Med Name: DULoxetine HCl 30 MG Oral Capsule Delayed Release Particles] 30 capsule 0     Sig: TAKE 1 CAPSULE BY MOUTH ONCE DAILY FOR 14 DAYS THEN 1 CAPSULE BY MOUTH TWICE DAILY.

## 2023-02-01 ENCOUNTER — OFFICE VISIT (OUTPATIENT)
Dept: INTERNAL MEDICINE | Age: 23
End: 2023-02-01

## 2023-02-01 VITALS
DIASTOLIC BLOOD PRESSURE: 88 MMHG | OXYGEN SATURATION: 95 % | BODY MASS INDEX: 23.59 KG/M2 | WEIGHT: 183.8 LBS | HEIGHT: 74 IN | SYSTOLIC BLOOD PRESSURE: 126 MMHG | RESPIRATION RATE: 16 BRPM | HEART RATE: 98 BPM

## 2023-02-01 DIAGNOSIS — F33.1 MDD (MAJOR DEPRESSIVE DISORDER), RECURRENT EPISODE, MODERATE (HCC): Primary | ICD-10-CM

## 2023-02-01 DIAGNOSIS — G40.409 OTHER GENERALIZED EPILEPSY, NOT INTRACTABLE, WITHOUT STATUS EPILEPTICUS (HCC): ICD-10-CM

## 2023-02-01 DIAGNOSIS — F39 MOOD DISORDER (HCC): ICD-10-CM

## 2023-02-01 PROCEDURE — 99214 OFFICE O/P EST MOD 30 MIN: CPT | Performed by: NURSE PRACTITIONER

## 2023-02-01 RX ORDER — ARIPIPRAZOLE 5 MG/1
5 TABLET ORAL DAILY
Qty: 30 TABLET | Refills: 1 | Status: SHIPPED | OUTPATIENT
Start: 2023-02-01

## 2023-02-01 SDOH — ECONOMIC STABILITY: INCOME INSECURITY: HOW HARD IS IT FOR YOU TO PAY FOR THE VERY BASICS LIKE FOOD, HOUSING, MEDICAL CARE, AND HEATING?: NOT VERY HARD

## 2023-02-01 SDOH — ECONOMIC STABILITY: FOOD INSECURITY: WITHIN THE PAST 12 MONTHS, YOU WORRIED THAT YOUR FOOD WOULD RUN OUT BEFORE YOU GOT MONEY TO BUY MORE.: NEVER TRUE

## 2023-02-01 SDOH — ECONOMIC STABILITY: HOUSING INSECURITY
IN THE LAST 12 MONTHS, WAS THERE A TIME WHEN YOU DID NOT HAVE A STEADY PLACE TO SLEEP OR SLEPT IN A SHELTER (INCLUDING NOW)?: NO

## 2023-02-01 SDOH — ECONOMIC STABILITY: FOOD INSECURITY: WITHIN THE PAST 12 MONTHS, THE FOOD YOU BOUGHT JUST DIDN'T LAST AND YOU DIDN'T HAVE MONEY TO GET MORE.: NEVER TRUE

## 2023-02-01 ASSESSMENT — PATIENT HEALTH QUESTIONNAIRE - PHQ9
4. FEELING TIRED OR HAVING LITTLE ENERGY: 3
SUM OF ALL RESPONSES TO PHQ QUESTIONS 1-9: 19
1. LITTLE INTEREST OR PLEASURE IN DOING THINGS: 3
6. FEELING BAD ABOUT YOURSELF - OR THAT YOU ARE A FAILURE OR HAVE LET YOURSELF OR YOUR FAMILY DOWN: 3
9. THOUGHTS THAT YOU WOULD BE BETTER OFF DEAD, OR OF HURTING YOURSELF: 1
5. POOR APPETITE OR OVEREATING: 3
3. TROUBLE FALLING OR STAYING ASLEEP: 3
SUM OF ALL RESPONSES TO PHQ QUESTIONS 1-9: 19
7. TROUBLE CONCENTRATING ON THINGS, SUCH AS READING THE NEWSPAPER OR WATCHING TELEVISION: 0
SUM OF ALL RESPONSES TO PHQ9 QUESTIONS 1 & 2: 6
SUM OF ALL RESPONSES TO PHQ QUESTIONS 1-9: 18
8. MOVING OR SPEAKING SO SLOWLY THAT OTHER PEOPLE COULD HAVE NOTICED. OR THE OPPOSITE, BEING SO FIGETY OR RESTLESS THAT YOU HAVE BEEN MOVING AROUND A LOT MORE THAN USUAL: 0
SUM OF ALL RESPONSES TO PHQ QUESTIONS 1-9: 19
2. FEELING DOWN, DEPRESSED OR HOPELESS: 3

## 2023-02-01 ASSESSMENT — ANXIETY QUESTIONNAIRES
5. BEING SO RESTLESS THAT IT IS HARD TO SIT STILL: 1
7. FEELING AFRAID AS IF SOMETHING AWFUL MIGHT HAPPEN: 3
6. BECOMING EASILY ANNOYED OR IRRITABLE: 3
GAD7 TOTAL SCORE: 19
1. FEELING NERVOUS, ANXIOUS, OR ON EDGE: 3
IF YOU CHECKED OFF ANY PROBLEMS ON THIS QUESTIONNAIRE, HOW DIFFICULT HAVE THESE PROBLEMS MADE IT FOR YOU TO DO YOUR WORK, TAKE CARE OF THINGS AT HOME, OR GET ALONG WITH OTHER PEOPLE: VERY DIFFICULT
4. TROUBLE RELAXING: 3
2. NOT BEING ABLE TO STOP OR CONTROL WORRYING: 3
3. WORRYING TOO MUCH ABOUT DIFFERENT THINGS: 3

## 2023-02-01 ASSESSMENT — ENCOUNTER SYMPTOMS
RESPIRATORY NEGATIVE: 1
CHEST TIGHTNESS: 0
SHORTNESS OF BREATH: 0
WHEEZING: 0

## 2023-02-01 ASSESSMENT — COLUMBIA-SUICIDE SEVERITY RATING SCALE - C-SSRS
6. HAVE YOU EVER DONE ANYTHING, STARTED TO DO ANYTHING, OR PREPARED TO DO ANYTHING TO END YOUR LIFE?: NO
2. HAVE YOU ACTUALLY HAD ANY THOUGHTS OF KILLING YOURSELF?: NO
1. WITHIN THE PAST MONTH, HAVE YOU WISHED YOU WERE DEAD OR WISHED YOU COULD GO TO SLEEP AND NOT WAKE UP?: YES

## 2023-02-02 NOTE — PATIENT INSTRUCTIONS
Patient Education        Learning About Depression  What is depression? Depression is an illness that affects how a person feels, thinks, and acts. It's different from normal feelings of sadness or grief. A person who has depression may have less energy. He or she may lose interest in daily activities and may feel sad and grouchy for a long time. Depression is a common illness. It affects men and women of all ages and backgrounds. Many people, and sometimes their families, feel embarrassed or ashamed about having depression. But it isn't a sign of personal weakness. It's not a character flaw. A person who is depressed is not \"crazy. \" Depression is a medical illness. It's caused by changes in the natural chemicals in the brain. Most experts believe that a combination of family history (a person's genes) and stressful life events can cause depression. Health problems may also cause depression or make it worse. It's common for people with long-term (chronic) health problems like coronary artery disease, diabetes, cancer, or chronic pain to feel depressed. It's important to know that depression can be treated. The first step toward feeling better is often just seeing that the problem exists. What are the symptoms? The symptoms of depression may be hard to notice at first. They vary among people, and it's easy to confuse them with just feeling \"off. \" The two most common symptoms are:  Feeling sad or hopeless nearly every day for at least 2 weeks. Losing interest in or not getting pleasure from most activities that used to be enjoyable, and feeling this way nearly every day for at least 2 weeks. Other symptoms may appear. A person with depression may, almost every day:  Eat or sleep more or less than usual.  Feel tired. Feel unworthy or guilty. Find it hard to focus, remember things, or make decisions. A serious symptom of depression is thinking about death or suicide.  If you or someone you care about talks about this or about feeling hopeless, get help right away. How is it treated? Doctors usually treat depression with medicines or counseling. Often a combination of the two works best. Many people don't get help because they think that they'll get over the depression on their own. But some people do not get better without treatment. Antidepressant medicines can improve the symptoms of depression in 1 to 3 weeks. But it can take 6 to 8 weeks to see more improvement. Your doctor will likely have you keep taking these medicines for at least 6 months. In many cases, counseling can work as well as medicines to treat mild to moderate depression. Counseling is done by licensed mental health providers, such as psychologists and social workers. This kind of treatment deals with how you think about things and how you act each day. If depression is caused by a medical problem, treating that problem may also help relieve the depression. How can you support someone who has depression? If someone you care about is depressed, you may feel helpless. But there are some things you can do. Help the person get treatment or stay with it. This is the best thing you can do. Support and encourage the person. Help the person have good health habits. Urge the person to get regular exercise, eat a balanced diet, and get enough sleep. Take care of yourself. Ask others to give you emotional and practical support while you are helping a friend or loved one who has depression. Where to get help 24 hours a day, 7 days a week   If you or someone you know talks about suicide, self-harm, a mental health crisis, a substance use crisis, or any other kind of emotional distress, get help right away. You can:  Call the Suicide and Crisis Lifeline at 65. Call 2-824-586-PDPO (5-786.950.1866). Text HOME to 871383 to access the Crisis Text Line. Consider saving these numbers in your phone. Where can you learn more?   Go to http://www.woods.com/ and enter N437 to learn more about \"Learning About Depression. \"  Current as of: February 9, 2022               Content Version: 13.5  © 3665-8378 Healthwise, Incorporated. Care instructions adapted under license by [facility] ([facility]). If you have questions about a medical condition or this instruction, always ask your healthcare professional. [place] [address] any warranty or liability for your use of this information.

## 2023-03-08 PROBLEM — F41.8 MIXED ANXIETY DEPRESSIVE DISORDER: Status: ACTIVE | Noted: 2023-03-08

## 2023-03-08 PROBLEM — R63.6 UNDERWEIGHT: Status: ACTIVE | Noted: 2023-03-08

## 2023-03-08 PROBLEM — R25.2 CRAMPS OF LOWER EXTREMITY: Status: ACTIVE | Noted: 2023-03-08

## 2023-03-08 PROBLEM — R56.9 GENERALIZED SEIZURE (HCC): Status: ACTIVE | Noted: 2023-03-08

## 2023-04-27 DIAGNOSIS — G40.409 OTHER GENERALIZED EPILEPSY, NOT INTRACTABLE, WITHOUT STATUS EPILEPTICUS (HCC): ICD-10-CM

## 2023-04-28 RX ORDER — LEVETIRACETAM 500 MG/1
TABLET ORAL
Qty: 60 TABLET | Refills: 1 | Status: SHIPPED | OUTPATIENT
Start: 2023-04-28

## 2023-10-23 DIAGNOSIS — Z86.73 HISTORY OF ISCHEMIC MIDDLE CEREBRAL ARTERY STROKE: ICD-10-CM

## 2023-10-24 RX ORDER — ATORVASTATIN CALCIUM 10 MG/1
10 TABLET, FILM COATED ORAL NIGHTLY
Qty: 90 TABLET | Refills: 0 | Status: SHIPPED | OUTPATIENT
Start: 2023-10-24

## 2023-10-24 NOTE — TELEPHONE ENCOUNTER
Comments: [employer] message sent to schedule annual exam    Last Office Visit (last PCP visit):   2/1/2023    Next Visit Date:  No future appointments. **If hasn't been seen in over a year OR hasn't followed up according to last diabetes/ADHD visit, make appointment for patient before sending refill to provider.     Rx requested:  Requested Prescriptions     Pending Prescriptions Disp Refills    atorvastatin (LIPITOR) 10 MG tablet [Pharmacy Med Name: Atorvastatin Calcium 10 MG Oral Tablet] 90 tablet 0     Sig: TAKE 1 TABLET BY MOUTH AT BEDTIME

## 2023-12-31 DIAGNOSIS — F33.1 MDD (MAJOR DEPRESSIVE DISORDER), RECURRENT EPISODE, MODERATE (HCC): ICD-10-CM

## 2024-01-04 RX ORDER — DULOXETIN HYDROCHLORIDE 30 MG/1
30 CAPSULE, DELAYED RELEASE ORAL 2 TIMES DAILY
Qty: 60 CAPSULE | Refills: 0 | Status: SHIPPED | OUTPATIENT
Start: 2024-01-04

## 2024-01-04 NOTE — TELEPHONE ENCOUNTER
Comments:     Last Office Visit (last PCP visit):   2/1/2023    Next Visit Date:  No future appointments.    **If hasn't been seen in over a year OR hasn't followed up according to last diabetes/ADHD visit, make appointment for patient before sending refill to provider.    Rx requested:  Requested Prescriptions     Pending Prescriptions Disp Refills    DULoxetine (CYMBALTA) 30 MG extended release capsule [Pharmacy Med Name: DULoxetine HCl 30 MG Oral Capsule Delayed Release Particles] 30 capsule 0     Sig: Take 1 capsule by mouth twice daily

## 2024-01-25 DIAGNOSIS — Z86.73 HISTORY OF ISCHEMIC MIDDLE CEREBRAL ARTERY STROKE: ICD-10-CM

## 2024-01-25 RX ORDER — ATORVASTATIN CALCIUM 10 MG/1
10 TABLET, FILM COATED ORAL NIGHTLY
Qty: 30 TABLET | Refills: 0 | Status: SHIPPED | OUTPATIENT
Start: 2024-01-25

## 2024-01-25 NOTE — TELEPHONE ENCOUNTER
Comments: Pt called stated he is completely out of medication.  Scheduled pt for 1/29/24.  Pt is requesting enough medication to get him to next appointment.     Last Office Visit (last PCP visit):   2/1/2023    Next Visit Date:  Future Appointments   Date Time Provider Department Center   1/29/2024  2:15 PM Leslie Bruce APRN - CNP [facility]       **If hasn't been seen in over a year OR hasn't followed up according to last diabetes/ADHD visit, make appointment for patient before sending refill to provider.    Rx requested:  Requested Prescriptions     Pending Prescriptions Disp Refills    atorvastatin (LIPITOR) 10 MG tablet 90 tablet 0     Sig: Take 1 tablet by mouth at bedtime

## 2024-01-26 RX ORDER — ATORVASTATIN CALCIUM 10 MG/1
10 TABLET, FILM COATED ORAL NIGHTLY
Qty: 90 TABLET | Refills: 0 | OUTPATIENT
Start: 2024-01-26

## 2024-03-08 DIAGNOSIS — Z86.73 HISTORY OF ISCHEMIC MIDDLE CEREBRAL ARTERY STROKE: ICD-10-CM

## 2024-03-11 RX ORDER — ATORVASTATIN CALCIUM 10 MG/1
10 TABLET, FILM COATED ORAL NIGHTLY
Qty: 90 TABLET | Refills: 0 | OUTPATIENT
Start: 2024-03-11

## 2024-03-11 NOTE — TELEPHONE ENCOUNTER
Comments: LVM for patient to call the office back to schedule his yearly appointment    Last Office Visit (last PCP visit):   2/1/2023    Next Visit Date:  No future appointments.    **If hasn't been seen in over a year OR hasn't followed up according to last diabetes/ADHD visit, make appointment for patient before sending refill to provider.    Rx requested:  Requested Prescriptions     Pending Prescriptions Disp Refills    atorvastatin (LIPITOR) 10 MG tablet [Pharmacy Med Name: Atorvastatin Calcium 10 MG Oral Tablet] 90 tablet 0     Sig: TAKE 1 TABLET BY MOUTH AT BEDTIME

## 2024-03-11 NOTE — TELEPHONE ENCOUNTER
Needs appt before will fill any more meds. He did not show for his Jan visit.    Detail Level: Simple

## 2024-03-14 ENCOUNTER — OFFICE VISIT (OUTPATIENT)
Dept: INTERNAL MEDICINE | Age: 24
End: 2024-03-14

## 2024-03-14 VITALS
TEMPERATURE: 97.1 F | SYSTOLIC BLOOD PRESSURE: 122 MMHG | WEIGHT: 201.6 LBS | DIASTOLIC BLOOD PRESSURE: 88 MMHG | HEART RATE: 98 BPM | BODY MASS INDEX: 25.88 KG/M2 | OXYGEN SATURATION: 99 %

## 2024-03-14 DIAGNOSIS — F10.10 CHRONIC ALCOHOL ABUSE: ICD-10-CM

## 2024-03-14 DIAGNOSIS — K30 INDIGESTION: ICD-10-CM

## 2024-03-14 DIAGNOSIS — F33.1 MDD (MAJOR DEPRESSIVE DISORDER), RECURRENT EPISODE, MODERATE (HCC): Primary | ICD-10-CM

## 2024-03-14 DIAGNOSIS — G40.409 OTHER GENERALIZED EPILEPSY, NOT INTRACTABLE, WITHOUT STATUS EPILEPTICUS (HCC): ICD-10-CM

## 2024-03-14 DIAGNOSIS — Z86.73 HISTORY OF ISCHEMIC MIDDLE CEREBRAL ARTERY STROKE: ICD-10-CM

## 2024-03-14 PROBLEM — F39 MOOD DISORDER (HCC): Status: RESOLVED | Noted: 2023-02-01 | Resolved: 2024-03-14

## 2024-03-14 PROBLEM — R56.9 GENERALIZED SEIZURE (HCC): Status: RESOLVED | Noted: 2023-03-08 | Resolved: 2024-03-14

## 2024-03-14 PROBLEM — R63.6 UNDERWEIGHT: Status: RESOLVED | Noted: 2023-03-08 | Resolved: 2024-03-14

## 2024-03-14 PROBLEM — F41.8 MIXED ANXIETY DEPRESSIVE DISORDER: Status: RESOLVED | Noted: 2023-03-08 | Resolved: 2024-03-14

## 2024-03-14 PROBLEM — R25.2 CRAMPS OF LOWER EXTREMITY: Status: RESOLVED | Noted: 2023-03-08 | Resolved: 2024-03-14

## 2024-03-14 PROCEDURE — 99214 OFFICE O/P EST MOD 30 MIN: CPT | Performed by: NURSE PRACTITIONER

## 2024-03-14 RX ORDER — ATORVASTATIN CALCIUM 10 MG/1
10 TABLET, FILM COATED ORAL NIGHTLY
Qty: 90 TABLET | Refills: 3 | Status: SHIPPED | OUTPATIENT
Start: 2024-03-14

## 2024-03-14 RX ORDER — DULOXETIN HYDROCHLORIDE 30 MG/1
30 CAPSULE, DELAYED RELEASE ORAL 2 TIMES DAILY
Qty: 180 CAPSULE | Refills: 3 | Status: SHIPPED | OUTPATIENT
Start: 2024-03-14

## 2024-03-14 RX ORDER — LEVETIRACETAM 500 MG/1
TABLET ORAL
Qty: 180 TABLET | Refills: 3 | Status: SHIPPED | OUTPATIENT
Start: 2024-03-14

## 2024-03-14 SDOH — ECONOMIC STABILITY: FOOD INSECURITY: WITHIN THE PAST 12 MONTHS, THE FOOD YOU BOUGHT JUST DIDN'T LAST AND YOU DIDN'T HAVE MONEY TO GET MORE.: NEVER TRUE

## 2024-03-14 SDOH — ECONOMIC STABILITY: INCOME INSECURITY: HOW HARD IS IT FOR YOU TO PAY FOR THE VERY BASICS LIKE FOOD, HOUSING, MEDICAL CARE, AND HEATING?: NOT HARD AT ALL

## 2024-03-14 SDOH — ECONOMIC STABILITY: FOOD INSECURITY: WITHIN THE PAST 12 MONTHS, YOU WORRIED THAT YOUR FOOD WOULD RUN OUT BEFORE YOU GOT MONEY TO BUY MORE.: NEVER TRUE

## 2024-03-14 ASSESSMENT — ANXIETY QUESTIONNAIRES
7. FEELING AFRAID AS IF SOMETHING AWFUL MIGHT HAPPEN: 3
5. BEING SO RESTLESS THAT IT IS HARD TO SIT STILL: 2
1. FEELING NERVOUS, ANXIOUS, OR ON EDGE: 3
3. WORRYING TOO MUCH ABOUT DIFFERENT THINGS: 3
IF YOU CHECKED OFF ANY PROBLEMS ON THIS QUESTIONNAIRE, HOW DIFFICULT HAVE THESE PROBLEMS MADE IT FOR YOU TO DO YOUR WORK, TAKE CARE OF THINGS AT HOME, OR GET ALONG WITH OTHER PEOPLE: EXTREMELY DIFFICULT
GAD7 TOTAL SCORE: 20
2. NOT BEING ABLE TO STOP OR CONTROL WORRYING: 3
6. BECOMING EASILY ANNOYED OR IRRITABLE: 3
4. TROUBLE RELAXING: 3

## 2024-03-14 ASSESSMENT — PATIENT HEALTH QUESTIONNAIRE - PHQ9
1. LITTLE INTEREST OR PLEASURE IN DOING THINGS: 2
10. IF YOU CHECKED OFF ANY PROBLEMS, HOW DIFFICULT HAVE THESE PROBLEMS MADE IT FOR YOU TO DO YOUR WORK, TAKE CARE OF THINGS AT HOME, OR GET ALONG WITH OTHER PEOPLE: 3
4. FEELING TIRED OR HAVING LITTLE ENERGY: 3
6. FEELING BAD ABOUT YOURSELF - OR THAT YOU ARE A FAILURE OR HAVE LET YOURSELF OR YOUR FAMILY DOWN: 3
7. TROUBLE CONCENTRATING ON THINGS, SUCH AS READING THE NEWSPAPER OR WATCHING TELEVISION: 0
SUM OF ALL RESPONSES TO PHQ9 QUESTIONS 1 & 2: 5
5. POOR APPETITE OR OVEREATING: 3
9. THOUGHTS THAT YOU WOULD BE BETTER OFF DEAD, OR OF HURTING YOURSELF: 2
SUM OF ALL RESPONSES TO PHQ QUESTIONS 1-9: 19
2. FEELING DOWN, DEPRESSED OR HOPELESS: 3
3. TROUBLE FALLING OR STAYING ASLEEP: 3
SUM OF ALL RESPONSES TO PHQ QUESTIONS 1-9: 21
SUM OF ALL RESPONSES TO PHQ QUESTIONS 1-9: 21
8. MOVING OR SPEAKING SO SLOWLY THAT OTHER PEOPLE COULD HAVE NOTICED. OR THE OPPOSITE, BEING SO FIGETY OR RESTLESS THAT YOU HAVE BEEN MOVING AROUND A LOT MORE THAN USUAL: 2
SUM OF ALL RESPONSES TO PHQ QUESTIONS 1-9: 21

## 2024-03-14 ASSESSMENT — COLUMBIA-SUICIDE SEVERITY RATING SCALE - C-SSRS
2. HAVE YOU ACTUALLY HAD ANY THOUGHTS OF KILLING YOURSELF?: NO
1. WITHIN THE PAST MONTH, HAVE YOU WISHED YOU WERE DEAD OR WISHED YOU COULD GO TO SLEEP AND NOT WAKE UP?: YES
6. HAVE YOU EVER DONE ANYTHING, STARTED TO DO ANYTHING, OR PREPARED TO DO ANYTHING TO END YOUR LIFE?: NO

## 2024-03-14 NOTE — PROGRESS NOTES
[facility] PRIMARY CARE  [address] [facility] [address]  Dept: [phone]  Dept Fax: [phone]  Loc: [phone]     SUBJECTIVE    Carlos Newman (: 2000) is a 24 y.o. male, Established patient, here for evaluation of the following chief complaint(s):  1 Month Follow-Up (Mood check ) and Heartburn (Having a really hard time at night with acid coming up )      PCP:  Leslie Bruce, KARLA - CNP      Here today for checkup. Last seen over a year ago. Was started on abilify at that time d/t worsening moods and was to f/u in 1 month, but never did.     Depression/moods: Is not any better. Is taking cymbalta. Tried abilify for just a few days and didn't like how he felt, was weird. Says he hasn't been working since last year at [employer]. Has been living with his grandpa but is not a good place for him. He wants to move back in with his stepdad but was told he has to be working before coming home. He is considering trying to get a job back at [employer].     Epilepsy: Has not had any seizures since initial seizure that occurred when had the stroke 5-6 yrs ago. Is on keppra. Hasn't seen Dr. Benjamin osorio in over 2 yrs. Now doesn't have insurance, so can't afford to go.     Marfan's syndrome caused cryptogenic stroke. Never followed up with cardio as instructed/referred in past.     Admits is drinking beer in excess. Was drinking a 12 pack of 16 oz beers (roughly 16 regular beers a day) a day a year ago to help him fall asleep and cope with his depression. Did wean self down to 3-6 cans of 16 oz (5-8 drinks per day) Coors light beers since. Had weaned himself down over a slow time, slow enough that didn't experience any withdrawal symptoms. Has been doing the lower amount since summer. Reflux had been really bad since then which is why initially cut back. Still getting heartburn daily, even with dr pepper or other juice. Is taking a

## 2024-10-23 ENCOUNTER — HOSPITAL ENCOUNTER (EMERGENCY)
Facility: HOSPITAL | Age: 24
Discharge: HOME | End: 2024-10-23
Attending: STUDENT IN AN ORGANIZED HEALTH CARE EDUCATION/TRAINING PROGRAM

## 2024-10-23 VITALS
HEIGHT: 75 IN | SYSTOLIC BLOOD PRESSURE: 141 MMHG | HEART RATE: 94 BPM | OXYGEN SATURATION: 97 % | DIASTOLIC BLOOD PRESSURE: 90 MMHG | TEMPERATURE: 97.2 F | BODY MASS INDEX: 24.87 KG/M2 | RESPIRATION RATE: 18 BRPM | WEIGHT: 200 LBS

## 2024-10-23 DIAGNOSIS — K21.9 GASTROESOPHAGEAL REFLUX DISEASE, UNSPECIFIED WHETHER ESOPHAGITIS PRESENT: Primary | ICD-10-CM

## 2024-10-23 PROCEDURE — 96374 THER/PROPH/DIAG INJ IV PUSH: CPT

## 2024-10-23 PROCEDURE — 99284 EMERGENCY DEPT VISIT MOD MDM: CPT | Mod: 25

## 2024-10-23 PROCEDURE — 2500000001 HC RX 250 WO HCPCS SELF ADMINISTERED DRUGS (ALT 637 FOR MEDICARE OP): Performed by: STUDENT IN AN ORGANIZED HEALTH CARE EDUCATION/TRAINING PROGRAM

## 2024-10-23 PROCEDURE — 2500000004 HC RX 250 GENERAL PHARMACY W/ HCPCS (ALT 636 FOR OP/ED): Performed by: STUDENT IN AN ORGANIZED HEALTH CARE EDUCATION/TRAINING PROGRAM

## 2024-10-23 RX ORDER — PANTOPRAZOLE SODIUM 40 MG/10ML
40 INJECTION, POWDER, LYOPHILIZED, FOR SOLUTION INTRAVENOUS DAILY
Status: DISCONTINUED | OUTPATIENT
Start: 2024-10-23 | End: 2024-10-23 | Stop reason: HOSPADM

## 2024-10-23 RX ORDER — ALUMINUM HYDROXIDE, MAGNESIUM HYDROXIDE, AND SIMETHICONE 1200; 120; 1200 MG/30ML; MG/30ML; MG/30ML
30 SUSPENSION ORAL ONCE
Status: COMPLETED | OUTPATIENT
Start: 2024-10-23 | End: 2024-10-23

## 2024-10-23 RX ORDER — SUCRALFATE 1 G/1
1 TABLET ORAL
Qty: 120 TABLET | Refills: 0 | Status: SHIPPED | OUTPATIENT
Start: 2024-10-23 | End: 2024-11-22

## 2024-10-23 RX ORDER — PANTOPRAZOLE SODIUM 20 MG/1
20 TABLET, DELAYED RELEASE ORAL DAILY
Qty: 20 TABLET | Refills: 0 | Status: SHIPPED | OUTPATIENT
Start: 2024-10-23 | End: 2024-11-12

## 2024-10-23 ASSESSMENT — PAIN SCALES - GENERAL: PAINLEVEL_OUTOF10: 0 - NO PAIN

## 2024-10-23 ASSESSMENT — PAIN - FUNCTIONAL ASSESSMENT: PAIN_FUNCTIONAL_ASSESSMENT: 0-10

## 2024-10-23 ASSESSMENT — COLUMBIA-SUICIDE SEVERITY RATING SCALE - C-SSRS
2. HAVE YOU ACTUALLY HAD ANY THOUGHTS OF KILLING YOURSELF?: NO
6. HAVE YOU EVER DONE ANYTHING, STARTED TO DO ANYTHING, OR PREPARED TO DO ANYTHING TO END YOUR LIFE?: NO
1. IN THE PAST MONTH, HAVE YOU WISHED YOU WERE DEAD OR WISHED YOU COULD GO TO SLEEP AND NOT WAKE UP?: NO

## 2024-10-23 ASSESSMENT — PAIN DESCRIPTION - LOCATION: LOCATION: ABDOMEN

## 2024-10-23 NOTE — ED PROVIDER NOTES
HPI   Chief Complaint   Patient presents with    Abdominal Pain       24-year-old male presenting for evaluation of epigastric discomfort.  Patient reports a longstanding history of indigestion.  Takes omeprazole over-the-counter intermittently as needed.  Patient reports that last night he felt like his indigestion was very bad, felt like it was going up into his throat.  Patient woke up this morning with metallic taste in his mouth and irritation in his throat.  States last night he had episode of vomiting due to the significant indigestion.  Pain currently is mild burning.  No fevers.      History provided by:  Patient          Patient History   No past medical history on file.  No past surgical history on file.  No family history on file.  Social History     Tobacco Use    Smoking status: Not on file    Smokeless tobacco: Not on file   Substance Use Topics    Alcohol use: Not on file    Drug use: Not on file       Physical Exam   ED Triage Vitals [10/23/24 1139]   Temperature Heart Rate Respirations BP   36.2 °C (97.2 °F) 94 18 141/90      Pulse Ox Temp Source Heart Rate Source Patient Position   97 % Temporal -- Sitting      BP Location FiO2 (%)     Right arm --       Physical Exam  Vitals and nursing note reviewed.   Constitutional:       General: He is not in acute distress.     Appearance: Normal appearance. He is not ill-appearing.   HENT:      Head: Atraumatic.      Mouth/Throat:      Mouth: Mucous membranes are moist.      Pharynx: Posterior oropharyngeal erythema present. No oropharyngeal exudate.   Eyes:      Conjunctiva/sclera: Conjunctivae normal.   Cardiovascular:      Rate and Rhythm: Normal rate.   Pulmonary:      Effort: Pulmonary effort is normal. No respiratory distress.   Abdominal:      General: There is no distension.      Palpations: Abdomen is soft.      Tenderness: There is no abdominal tenderness. There is no guarding or rebound.   Musculoskeletal:         General: No deformity.       Cervical back: Normal range of motion.   Skin:     Findings: No rash.   Neurological:      Mental Status: He is alert. Mental status is at baseline.   Psychiatric:         Behavior: Behavior normal.           ED Course & MDM   Diagnoses as of 10/23/24 1430   Gastroesophageal reflux disease, unspecified whether esophagitis present                 No data recorded                                 Medical Decision Making  24-year-old male presenting with worsening indigestion.  On exam patient awake and alert, no acute distress.  Afebrile and hemodynamically stable.  Abdomen is soft and benign.  Patient's posterior oropharynx with erythema and irritation likely due to significant acid reflux.  Patient given Protonix IV and Maalox while in the ED.  On reevaluation patient reports improvement in symptoms.  Discussed starting Carafate and Protonix for further treatment for the next few weeks.  Discussed follow-up with GI for further evaluation.  Discussed importance of avoidance of certain food triggers as well as not eating a few hours before laying down for bed.        Procedure  Procedures     Venkata Burton MD  10/23/24 1430

## 2024-10-23 NOTE — Clinical Note
Rohan Hernandez was seen and treated in our emergency department on 10/23/2024.  He may return to work on 10/24/2024.       If you have any questions or concerns, please don't hesitate to call.      Venkata Burton MD

## 2025-04-05 DIAGNOSIS — G40.409 OTHER GENERALIZED EPILEPSY, NOT INTRACTABLE, WITHOUT STATUS EPILEPTICUS: ICD-10-CM

## 2025-04-06 NOTE — TELEPHONE ENCOUNTER
Comments: Left message for patient to call and schedule an appointment    Last Office Visit (last PCP visit):   3/14/2024    Next Visit Date:  No future appointments.    **If hasn't been seen in over a year OR hasn't followed up according to last diabetes/ADHD visit, make appointment for patient before sending refill to provider.    Rx requested:  Requested Prescriptions     Pending Prescriptions Disp Refills    levETIRAcetam (KEPPRA) 500 MG tablet [Pharmacy Med Name: levETIRAcetam 500 MG Oral Tablet] 180 tablet 0     Sig: TAKE 1 TABLET BY MOUTH IN THE MORNING AND 1 TAB BEFORE BEDTIME

## 2025-04-07 RX ORDER — LEVETIRACETAM 500 MG/1
TABLET ORAL
Qty: 180 TABLET | Refills: 0 | Status: SHIPPED | OUTPATIENT
Start: 2025-04-07 | End: 2025-04-09 | Stop reason: SDUPTHER

## 2025-04-07 NOTE — TELEPHONE ENCOUNTER
Comments: PT is scheduled     Last Office Visit (last PCP visit):   3/14/2024    Next Visit Date:  Future Appointments   Date Time Provider Department Center   4/9/2025  2:30 PM Leslie Bruec APRN - CNP [facility]   10/27/2025  3:45 PM Russ Alexander MD [facility] -       **If hasn't been seen in over a year OR hasn't followed up according to last diabetes/ADHD visit, make appointment for patient before sending refill to provider.    Rx requested:  Requested Prescriptions     Pending Prescriptions Disp Refills    levETIRAcetam (KEPPRA) 500 MG tablet [Pharmacy Med Name: levETIRAcetam 500 MG Oral Tablet] 180 tablet 0     Sig: TAKE 1 TABLET BY MOUTH IN THE MORNING AND 1 TAB BEFORE BEDTIME

## 2025-04-09 ENCOUNTER — OFFICE VISIT (OUTPATIENT)
Dept: INTERNAL MEDICINE | Age: 25
End: 2025-04-09

## 2025-04-09 ENCOUNTER — TELEPHONE (OUTPATIENT)
Dept: INTERNAL MEDICINE | Age: 25
End: 2025-04-09

## 2025-04-09 VITALS
SYSTOLIC BLOOD PRESSURE: 124 MMHG | WEIGHT: 193 LBS | BODY MASS INDEX: 24 KG/M2 | HEART RATE: 97 BPM | DIASTOLIC BLOOD PRESSURE: 88 MMHG | TEMPERATURE: 98.3 F | HEIGHT: 75 IN | OXYGEN SATURATION: 96 %

## 2025-04-09 DIAGNOSIS — Z00.00 ENCOUNTER FOR ANNUAL HEALTH EXAMINATION: Primary | ICD-10-CM

## 2025-04-09 DIAGNOSIS — G40.409 OTHER GENERALIZED EPILEPSY, NOT INTRACTABLE, WITHOUT STATUS EPILEPTICUS: ICD-10-CM

## 2025-04-09 DIAGNOSIS — F10.10 CHRONIC ALCOHOL ABUSE: ICD-10-CM

## 2025-04-09 DIAGNOSIS — F33.1 MDD (MAJOR DEPRESSIVE DISORDER), RECURRENT EPISODE, MODERATE (HCC): ICD-10-CM

## 2025-04-09 DIAGNOSIS — Z13.220 SCREENING, LIPID: ICD-10-CM

## 2025-04-09 DIAGNOSIS — Z13.1 SCREENING FOR DIABETES MELLITUS: ICD-10-CM

## 2025-04-09 DIAGNOSIS — H91.93 DECREASED HEARING OF BOTH EARS: ICD-10-CM

## 2025-04-09 DIAGNOSIS — Z86.73 HISTORY OF ISCHEMIC MIDDLE CEREBRAL ARTERY STROKE: ICD-10-CM

## 2025-04-09 PROBLEM — K30 INDIGESTION: Status: RESOLVED | Noted: 2024-03-14 | Resolved: 2025-04-09

## 2025-04-09 PROCEDURE — 99395 PREV VISIT EST AGE 18-39: CPT | Performed by: NURSE PRACTITIONER

## 2025-04-09 RX ORDER — LEVETIRACETAM 500 MG/1
TABLET ORAL
Qty: 180 TABLET | Refills: 0 | Status: SHIPPED | OUTPATIENT
Start: 2025-04-09

## 2025-04-09 RX ORDER — DULOXETIN HYDROCHLORIDE 30 MG/1
30 CAPSULE, DELAYED RELEASE ORAL 2 TIMES DAILY
Qty: 180 CAPSULE | Refills: 3 | Status: SHIPPED | OUTPATIENT
Start: 2025-04-09

## 2025-04-09 RX ORDER — ATORVASTATIN CALCIUM 10 MG/1
10 TABLET, FILM COATED ORAL NIGHTLY
Qty: 90 TABLET | Refills: 3 | Status: SHIPPED | OUTPATIENT
Start: 2025-04-09

## 2025-04-09 ASSESSMENT — PATIENT HEALTH QUESTIONNAIRE - PHQ9
7. TROUBLE CONCENTRATING ON THINGS, SUCH AS READING THE NEWSPAPER OR WATCHING TELEVISION: NEARLY EVERY DAY
SUM OF ALL RESPONSES TO PHQ QUESTIONS 1-9: 19
2. FEELING DOWN, DEPRESSED OR HOPELESS: NEARLY EVERY DAY
8. MOVING OR SPEAKING SO SLOWLY THAT OTHER PEOPLE COULD HAVE NOTICED. OR THE OPPOSITE, BEING SO FIGETY OR RESTLESS THAT YOU HAVE BEEN MOVING AROUND A LOT MORE THAN USUAL: MORE THAN HALF THE DAYS
1. LITTLE INTEREST OR PLEASURE IN DOING THINGS: SEVERAL DAYS
6. FEELING BAD ABOUT YOURSELF - OR THAT YOU ARE A FAILURE OR HAVE LET YOURSELF OR YOUR FAMILY DOWN: SEVERAL DAYS
SUM OF ALL RESPONSES TO PHQ QUESTIONS 1-9: 19
3. TROUBLE FALLING OR STAYING ASLEEP: NEARLY EVERY DAY
4. FEELING TIRED OR HAVING LITTLE ENERGY: SEVERAL DAYS
SUM OF ALL RESPONSES TO PHQ QUESTIONS 1-9: 17
5. POOR APPETITE OR OVEREATING: NEARLY EVERY DAY
SUM OF ALL RESPONSES TO PHQ QUESTIONS 1-9: 19
10. IF YOU CHECKED OFF ANY PROBLEMS, HOW DIFFICULT HAVE THESE PROBLEMS MADE IT FOR YOU TO DO YOUR WORK, TAKE CARE OF THINGS AT HOME, OR GET ALONG WITH OTHER PEOPLE: VERY DIFFICULT
9. THOUGHTS THAT YOU WOULD BE BETTER OFF DEAD, OR OF HURTING YOURSELF: MORE THAN HALF THE DAYS

## 2025-04-09 ASSESSMENT — COLUMBIA-SUICIDE SEVERITY RATING SCALE - C-SSRS
1. WITHIN THE PAST MONTH, HAVE YOU WISHED YOU WERE DEAD OR WISHED YOU COULD GO TO SLEEP AND NOT WAKE UP?: YES
2. HAVE YOU ACTUALLY HAD ANY THOUGHTS OF KILLING YOURSELF?: NO
6. HAVE YOU EVER DONE ANYTHING, STARTED TO DO ANYTHING, OR PREPARED TO DO ANYTHING TO END YOUR LIFE?: NO

## 2025-04-09 ASSESSMENT — ANXIETY QUESTIONNAIRES
2. NOT BEING ABLE TO STOP OR CONTROL WORRYING: NEARLY EVERY DAY
5. BEING SO RESTLESS THAT IT IS HARD TO SIT STILL: NEARLY EVERY DAY
3. WORRYING TOO MUCH ABOUT DIFFERENT THINGS: NOT AT ALL
1. FEELING NERVOUS, ANXIOUS, OR ON EDGE: NEARLY EVERY DAY
7. FEELING AFRAID AS IF SOMETHING AWFUL MIGHT HAPPEN: SEVERAL DAYS
IF YOU CHECKED OFF ANY PROBLEMS ON THIS QUESTIONNAIRE, HOW DIFFICULT HAVE THESE PROBLEMS MADE IT FOR YOU TO DO YOUR WORK, TAKE CARE OF THINGS AT HOME, OR GET ALONG WITH OTHER PEOPLE: VERY DIFFICULT
6. BECOMING EASILY ANNOYED OR IRRITABLE: NEARLY EVERY DAY
4. TROUBLE RELAXING: NEARLY EVERY DAY
GAD7 TOTAL SCORE: 16

## 2025-04-10 NOTE — PROGRESS NOTES
concerns please feel free to contact me to clarify.    The patient (or guardian, if applicable) and other individuals in attendance with the patient were advised that Artificial Intelligence will be utilized during this visit to record, process the conversation to generate a clinical note, and support improvement of the AI technology. The patient (or guardian, if applicable) and other individuals in attendance at the appointment consented to the use of AI, including the recording.      Leslie Bruce, KARLA - CNP

## 2025-04-10 NOTE — TELEPHONE ENCOUNTER
Please let him know his refills were sent and upon further review of his chart, he has not had labs drawn in 3 yrs.  Needs to get fasting labs done for me- can come here M-F 8 to 4 or go to [facility]. Make sure to fast 10-12 hrs, water,meds, black coffee only permitted. Orders are in.